# Patient Record
Sex: FEMALE | Race: WHITE
[De-identification: names, ages, dates, MRNs, and addresses within clinical notes are randomized per-mention and may not be internally consistent; named-entity substitution may affect disease eponyms.]

---

## 2022-03-03 ENCOUNTER — HOSPITAL ENCOUNTER (OUTPATIENT)
Dept: HOSPITAL 75 - CARD | Age: 62
End: 2022-03-03
Attending: INTERNAL MEDICINE
Payer: SELF-PAY

## 2022-03-03 VITALS — DIASTOLIC BLOOD PRESSURE: 75 MMHG | SYSTOLIC BLOOD PRESSURE: 146 MMHG

## 2022-03-03 DIAGNOSIS — I35.0: ICD-10-CM

## 2022-03-03 DIAGNOSIS — I51.7: Primary | ICD-10-CM

## 2022-03-03 PROCEDURE — 93017 CV STRESS TEST TRACING ONLY: CPT

## 2022-03-03 PROCEDURE — 78452 HT MUSCLE IMAGE SPECT MULT: CPT

## 2022-03-03 PROCEDURE — 93306 TTE W/DOPPLER COMPLETE: CPT

## 2022-03-03 RX ADMIN — Medication PRN ML: at 13:17

## 2022-03-03 RX ADMIN — Medication PRN ML: at 11:58

## 2022-03-04 NOTE — NUCLEAR STRESS TEST
REGADENOSON NUCLEAR STRESS


Date of procedure: 03/03/2022.


Primary care provider: None


Admitting physician: Aram Boss Jr., MD.





INDICATION: Abnormal electrocardiogram.





BASELINE ELECTROCARDIOGRAM: 


Sinus rhythm with possible old anterior myocardial infarction.





STRESS TEST PROCEDURE: The patient was administered 0.4 mg of intravenous 

Regadenoson. The resting heart rate was 85 bpm and the peak heart rate was 100 

bpm.  The resting blood pressure was 124/68 mmHg and the minimum blood pressure 

was 124/68 mmHg.  This represents a normal heart rate and a blunted blood 

pressure response to Regadenoson.  The test was stopped due to the protocol.  

There was no chest discomfort during the test.  There were no arrhythmias during

the test.  There were no significant stress induced electrocardiogram changes.





NUCLEAR PROCEDURE: The patient was administered 10.7 mCi of intravenous 

technetium 99m Tetrofosmin at rest for the rest images.  The patient was 

subsequently administered 30.9 mCi of intravenous technetium 99m Tetrofosmin at 

peak stress for the stress images.  Following an appropriate wait after each 

injection, imaging was obtained.  The images were subsequently processed and 

reformatted in the usual views.  Gated imaging was obtained.  The image quality 

was adequate with a mild degree of gastrointestinal attenuation artifact. CT 

attenuation correction was used as a adjunct to standard imaging. Both the 

corrected and uncorrected images were reviewed for interpretation. 





NUCLEAR RESULTS: There was a small, mild intensity, fixed distal anterior defect

with no evidence of inducible ischemia.  There was normal left ventricular 

chamber size with an end-diastolic volume of 35 mL and an end-systolic volume of

6 mL.  There was no evidence of transient ischemic dilatation.  The TID ratio 

was 0.95. There was normal wall motion in all segments with a calculated 

ejection fraction of 83%.





IMPRESSION:


1.  Normal heart rate and a blunted blood pressure response to regadenoson.


2.  There was no chest discomfort, arrhythmias, or electrocardiogram changes 

during the test.


3.  There was a small, mild intensity, fixed distal anterior defect with no 

evidence of inducible ischemia.


4.  There was normal wall motion in all segments with a calculated ejection frac

tion of 83%.


5.  This is an abnormal result although represents a low risk for possible 

future coronary ischemic events.





Certain portions of this document may have been dictated utilizing voice 

recognition technology.  Inherent to this technology, typographical and 

grammatical errors may exist.  As much as I am diligent to identify and correct 

these mistakes, some errors may remain in the document.











ARAM BOSS JR, MD          Mar 4, 2022 08:00

## 2023-07-20 ENCOUNTER — HOSPITAL ENCOUNTER (OUTPATIENT)
Dept: HOSPITAL 75 - ICU | Age: 63
Setting detail: OBSERVATION
LOS: 2 days | Discharge: NURSING, REHAB OR LONG-TERM CARE | End: 2023-07-22
Attending: INTERNAL MEDICINE | Admitting: INTERNAL MEDICINE
Payer: COMMERCIAL

## 2023-07-20 VITALS — DIASTOLIC BLOOD PRESSURE: 85 MMHG | SYSTOLIC BLOOD PRESSURE: 101 MMHG

## 2023-07-20 VITALS — WEIGHT: 252.21 LBS | BODY MASS INDEX: 39.58 KG/M2 | HEIGHT: 67.01 IN

## 2023-07-20 DIAGNOSIS — I11.0: ICD-10-CM

## 2023-07-20 DIAGNOSIS — I50.22: ICD-10-CM

## 2023-07-20 DIAGNOSIS — Z95.2: ICD-10-CM

## 2023-07-20 DIAGNOSIS — Z87.891: ICD-10-CM

## 2023-07-20 DIAGNOSIS — J44.9: ICD-10-CM

## 2023-07-20 DIAGNOSIS — Z79.84: ICD-10-CM

## 2023-07-20 DIAGNOSIS — I48.91: Primary | ICD-10-CM

## 2023-07-20 DIAGNOSIS — Z79.899: ICD-10-CM

## 2023-07-20 DIAGNOSIS — Z95.4: ICD-10-CM

## 2023-07-20 DIAGNOSIS — E78.5: ICD-10-CM

## 2023-07-20 DIAGNOSIS — G72.9: ICD-10-CM

## 2023-07-20 DIAGNOSIS — G47.33: ICD-10-CM

## 2023-07-20 DIAGNOSIS — E11.9: ICD-10-CM

## 2023-07-20 DIAGNOSIS — Z86.79: ICD-10-CM

## 2023-07-20 DIAGNOSIS — F32.A: ICD-10-CM

## 2023-07-20 PROCEDURE — 85610 PROTHROMBIN TIME: CPT

## 2023-07-20 PROCEDURE — 82947 ASSAY GLUCOSE BLOOD QUANT: CPT

## 2023-07-20 PROCEDURE — 36415 COLL VENOUS BLD VENIPUNCTURE: CPT

## 2023-07-20 PROCEDURE — 83735 ASSAY OF MAGNESIUM: CPT

## 2023-07-20 PROCEDURE — 80053 COMPREHEN METABOLIC PANEL: CPT

## 2023-07-20 PROCEDURE — 96365 THER/PROPH/DIAG IV INF INIT: CPT

## 2023-07-20 PROCEDURE — 96366 THER/PROPH/DIAG IV INF ADDON: CPT

## 2023-07-20 PROCEDURE — 94640 AIRWAY INHALATION TREATMENT: CPT

## 2023-07-20 PROCEDURE — 96375 TX/PRO/DX INJ NEW DRUG ADDON: CPT

## 2023-07-20 PROCEDURE — 96376 TX/PRO/DX INJ SAME DRUG ADON: CPT

## 2023-07-20 PROCEDURE — 85025 COMPLETE CBC W/AUTO DIFF WBC: CPT

## 2023-07-20 PROCEDURE — 84100 ASSAY OF PHOSPHORUS: CPT

## 2023-07-20 RX ADMIN — SENNOSIDES SCH MG: 8.6 TABLET, FILM COATED ORAL at 21:15

## 2023-07-20 RX ADMIN — INSULIN ASPART SCH UNIT: 100 INJECTION, SOLUTION INTRAVENOUS; SUBCUTANEOUS at 21:15

## 2023-07-20 RX ADMIN — DOCUSATE SODIUM SCH MG: 100 CAPSULE ORAL at 21:14

## 2023-07-20 RX ADMIN — Medication SCH MLS/HR: at 19:25

## 2023-07-20 NOTE — CONSULTATION-CARDIOLOGY
HPI-Cardiology


Cardiology Consultation:


Date of Consultation


7/20/23


Date of Admission





Attending Physician





Admitting Physician


Admitting Physician:


Lynn Villela DO 








Attending Physician:


Lynn Villela DO


Consulting Physician


POLO CAVAZOS MD





HPI:


Time Seen by a Provider:  20:59


Chief Complaint:


Tachycardia


This is a 63-year-old lady who has history of severe mitral stenosis and 

congestive heart failure and underwent mitral valve replacement a week ago at 

[facility].  She does have history of postoperative atrial fibrillation. 

She has a history of diabetes, hypertension and hyperlipidemia.





She had an episode of atrial fibrillation with RVR and currently still in RVR.  

She was started on IV Cardizem and transferred to the ICU.





Review of Systems-Cardiology


Review of Systems


Constitutional:  no symptoms reported


Eyes:  no symptoms reported


Ears/Nose/Throat:  no symptoms reported


Respiratory:  SOB with excertion


Cardiovascular:  irregular heart rate


Gastrointestinal:  no symptoms reported


Genitourinary:  no symptoms reported


Musculoskeletal:  no symptoms reported


Skin:  no symptoms reported


Psychiatric/Neurological:  no symptoms reported


Hematologic:  no symptoms reported





PMH-Social-Family Hx


Patient Social History


Smoking Status:  Former Smoker


Alcohol Use?:  No


Pt feels they are or have been:  No





Past Medical History


PMH


As described under Assessment.





Allergies and Home Medications


Allergies


Coded Allergies:  


     No Allergy Information Available (Unverified , 3/3/22)





Patient Home Medication List


Home Medication List Reviewed:  Yes


Acetaminophen (Tylenol Extra Strength) 500 Mg Tablet, 1,000 MG PO Q6H, 

(Reported)


   Entered as Reported by: PELON FABIAN on 7/19/23 0931


   Last Action: Held


Albuterol Sulfate (Ventolin Hfa) 90 Mcg Hfa.aer.ad, 1-2 PUFF INH TID PRN for 

SHORTNESS OF BREATH, (Reported)


   Entered as Reported by: PELON FABIAN on 7/19/23 0931


   Last Action: Continued


Amiodarone HCl (Amiodarone HCl) 200 Mg Tablet, 400 MG PO DAILY, (Reported)


   Entered as Reported by: PELON FABIAN on 7/19/23 0931


   Last Action: Held


Amiodarone HCl (Amiodarone HCl) 200 Mg Tablet, 200 MG PO BID, (Reported)


   Entered as Reported by: PELON FABIAN on 7/19/23 0931


   Last Action: Held


Amiodarone HCl (Amiodarone HCl) 200 Mg Tablet, 200 MG PO DAILY, (Reported)


   Entered as Reported by: PELON FABIAN on 7/19/23 0931


   Last Action: Held


Ascorbic Acid (Vitamin C) 1,000 Mg Tablet, 1,000 MG PO DAILY, (Reported)


   Entered as Reported by: PELON FABIAN on 7/19/23 0931


   Last Action: Held


Aspirin (Aspirin EC) 81 Mg Tablet.dr, 81 MG PO DAILY, (Reported)


   Entered as Reported by: PELON FABIAN on 7/19/23 0931


   Last Action: Continued


Atorvastatin Calcium (Atorvastatin Calcium) 20 Mg Tablet, 20 MG PO HS, 

(Reported)


   Entered as Reported by: PELON FABIAN on 7/19/23 0931


   Last Action: Continued


Budesonide/Formoterol Fumarate (Symbicort 80-4.5 Mcg Inhaler) 80 Mcg-4.5 

Mcg/Actuation Hfa.aer.ad, 2 PUFF IH BID, (Reported)


   Entered as Reported by: PELON FABIAN on 7/19/23 0931


   Last Action: Converted


Bumetanide (Bumetanide) 2 Mg Tablet, 2 MG PO BID, (Reported)


   Entered as Reported by: PELON FABIAN on 7/19/23 0931


   Last Action: Converted


Canagliflozin/Metformin HCl (Invokamet 50-1,000 mg Tablet) 50 Mg-1,000 Mg 

Tablet, 1 EACH PO BID, (Reported)


   Entered as Reported by: PELON FABIAN on 7/19/23 0931


   Last Action: Converted


Dulaglutide (Trulicity) 0.75 Mg/0.5 Ml Pen.injctr, 0.75 MG SQ WEEK, (Reported)


   Entered as Reported by: PELON FABIAN on 7/19/23 0931


   Last Action: Converted


Metoprolol Tartrate (Metoprolol Tartrate) 25 Mg Tablet, 12.5 MG PO BID, 

(Reported)


   Entered as Reported by: PELON FABIAN on 7/19/23 0931


   Last Action: Held


Omeprazole (Omeprazole) 20 Mg Tab.rap.dr, 20 MG PO DAILY, (Reported)


   Entered as Reported by: PELON FABIAN on 7/19/23 0931


   Last Action: Converted


Oxycodone HCl/Acetaminophen (Percocet 5-325 mg Tablet) 1 Each Tablet, 1-2 TAB PO

Q6H PRN for PAIN-MODERATE (5-7), (Reported)


   Entered as Reported by: PELON FABIAN on 7/19/23 0931


   Last Action: Continued


Potassium Chloride (K-Tab ER) 10 Meq Tablet.er, 10 MEQ PO BID, (Reported)


   Entered as Reported by: PELON FABIAN on 7/19/23 0931


   Last Action: Continued


Sennosides/Docusate Sodium (Senna-S Tablet) 8.6 Mg-50 Mg Tablet, 2 EACH PO BID, 

(Reported)


   Entered as Reported by: PELON FABIAN on 7/19/23 0931


   Last Action: Continued


Vilazodone Hydrochloride (Viibryd) 40 Mg Tablet, 40 MG PO DAILY, (Reported)


   Entered as Reported by: PELON FABIAN on 7/19/23 0931


   Last Action: Converted


Warfarin Sodium (Warfarin Sodium) 5 Mg Tablet, 5 MG PO CARDOZA,SA, (Reported)


   Entered as Reported by: PELON FABIAN on 7/19/23 0931


   Last Action: Held


Warfarin Sodium (Warfarin Sodium) 7.5 Mg Tablet, 7.5 MG PO MO,TU,WE,TH,FR, 

(Reported)


   Entered as Reported by: PELON FABIAN on 7/19/23 0931


   Last Action: Held





Exam


Vital Signs





Vital Signs








  Date Time  Temp Pulse Resp B/P (MAP) Pulse Ox O2 Delivery O2 Flow Rate FiO2


 


7/20/23 21:06  115   96   21


 


7/20/23 20:54      Room Air  


 


7/20/23 19:25    101/85    


 


7/20/23 19:00   16     








Physical Exam


Constitutional: No respiratory distress.


Chest: Clear to auscultation bilaterally.


CVS: Irregularly irregular rhythm.  No murmur.


Neuro: Alert and oriented.


Abdomen: Protuberant.


Labs





Laboratory Tests








Test


 7/20/23


21:10 Range/Units


 


 


Glucometer 144 H   MG/DL











ECG Impression


ECG


Initial ECG Impression:  Atrial Fibrillation w/RVR





A/P-Cardiology


Assessment/Admission Diagnosis


afib rvr


h/o mvr


Mitral stenosis,


History of congestive heart failure,


Diabetes,


Hypertension,


Hyperlipidemia





Plan


afib rvr - cardizem infusion, Continue warfarin.  Patient is already on 

amiodarone. Continue to follow.  If patient continues to have atrial 

fibrillation she may be a candidate for transesophageal echocardiogram assisted 

cardioversion.


history of MVR,


Chronic oral anticoagulation, continue warfarin.


Diabetes, hypertension, hyperlipidemia: Deferred to the primary team.











POLO CAVAZOS MD             Jul 20, 2023 21:00

## 2023-07-20 NOTE — TELE-ICU CONSULT
History of Present Illness


History of Present Illness


Date Seen by Provider:  Jul 20, 2023


Time Seen by Provider:  19:38


Date of Admission


7/20/23


History of Present Illness


(Tele-ICU Physician , Progress Note )


Service provided via interactive audio and video telecommunStratusLIVE  E-CARE 

system to a patient admitted to ICU bed in [facility].


Patient is seen today due to persistent need of  ICU care








Available chart/ vitals / labs / Images reviewed


Video assessment done using   teleICU camera, rest of exam as per RN





She is a 63-year-old  female with past medical history of hypertension,

type 2 diabetes mellitus who recently underwent earlier this month mitral valve 

replacement for severe mitral stenosis and congestive heart failure at [facility] and postoperatively she developed productive atrial fibrillation.  

She was started on amiodarone and warfarin.  Subsequently yesterday she is 

admitted to the inpatient rehab and via [facility].  Today she 

is transferred to ICU because of development of atrial fibrillation with rapid 

ventricular rate.  She is started on IV Cardizem drip.  Still her heart rate is 

about 120/min.  She is in no acute distress





Impression


1.  Atrial fibrillation with rapid ventricular rate


2.  S/p mitral valve replacement


3.  Type 2 diabetes mellitus


4.  Hyperlipidemia





Recommendations


1.  Continue Cardizem drip and if necessary will give IV digoxin


2.  Cardiology consultation.


3.  Diabetes management per primary care.


4.  Continue warfarin.


Coordination of care with primary care physician and bedside consultants.





I am remotely monitoring this patient from Tele icu station in [place].  I am 

unable to do the bedside exam, and history/physical and pertinent information is

taken from other notes in the computer and bedside staff.





Certain portions of this document may have been dictated utilizing voice 

recognition technology such as Dragon.  Inherent to this technology, 

typographical and grammatical errors may exist.  As much as I am diligent to 

identify and correct to these mistakes, some errors may remain in the document.





Critical care time devoted to this patient today is approximately is-30 minutes





Allergies and Home Medications


Allergies


Coded Allergies:  


     No Allergy Information Available (Unverified , 3/3/22)





Home Medications


Acetaminophen 500 Mg Tablet, 1,000 MG PO Q6H, (Reported)


Albuterol Sulfate 90 Mcg Hfa.aer.ad, 1-2 PUFF INH TID PRN for SHORTNESS OF 

BREATH, (Reported)


Amiodarone HCl 200 Mg Tablet, 400 MG PO DAILY, (Reported)


   1/3 TAKE FOR 14 DAYS- THEN DECREASE TO 200MG TWICE DAILY X 14 DAYS, THEN 

200MG DAILY X 14 DAYS TAKE WITH FOOD 


Amiodarone HCl 200 Mg Tablet, 200 MG PO BID, (Reported)


   2/3 TAKE FOR 14 DAYS THEN DECREASE TO 200MG DAILY X 14 DAYS TAKE WITH FOOD 


Amiodarone HCl 200 Mg Tablet, 200 MG PO DAILY, (Reported)


   3/3 TAKE FOR 14 DAYS TAKE WITH FOOD 


Ascorbic Acid 1,000 Mg Tablet, 1,000 MG PO DAILY, (Reported)


Aspirin 81 Mg Tablet.dr, 81 MG PO DAILY, (Reported)


Atorvastatin Calcium 20 Mg Tablet, 20 MG PO HS, (Reported)


Budesonide/Formoterol Fumarate 80 Mcg-4.5 Mcg/Actuation Hfa.aer.ad, 2 PUFF IH 

BID, (Reported)


Bumetanide 2 Mg Tablet, 2 MG PO BID, (Reported)


Canagliflozin/Metformin HCl 50 Mg-1,000 Mg Tablet, 1 EACH PO BID, (Reported)


Dulaglutide 0.75 Mg/0.5 Ml Pen.injctr, 0.75 MG SQ WEEK, (Reported)


Metoprolol Tartrate 25 Mg Tablet, 12.5 MG PO BID, (Reported)


   TAKES  OF A 25MG TAB 


Omeprazole 20 Mg Tab.rap.dr, 20 MG PO DAILY, (Reported)


   TAKE BEFORE BREAKFAST 


Oxycodone HCl/Acetaminophen 1 Each Tablet, 1-2 TAB PO Q6H PRN for PAIN-MODERATE 

(5-7), (Reported)


Potassium Chloride 10 Meq Tablet.er, 10 MEQ PO BID, (Reported)


Sennosides/Docusate Sodium 8.6 Mg-50 Mg Tablet, 2 EACH PO BID, (Reported)


Vilazodone Hydrochloride 40 Mg Tablet, 40 MG PO DAILY, (Reported)


Warfarin Sodium 5 Mg Tablet, 5 MG PO CARDOZA,SA, (Reported)


Warfarin Sodium 7.5 Mg Tablet, 7.5 MG PO MO,TU,WE,TH,FR, (Reported)





Past Medical/Social/Family Hx


Immunizations Up To Date


Tetanus Booster (TDap):  Unknown


Hepatitis A:  No


Hepatitis B:  Yes


TB Skin Test:  None





Current Status


Primary Language:  English





Review of Systems


Constitutional:  see HPI





Focused Exam


Height, Weight, BMI


Height: '"


Weight: lbs. oz. kg; 41.59 BMI


Method:





Exam


Exam


Patient acknowledged, consented, and participated in this virtual visit which 

was conducted using real time audio/video


Vital Signs








  Date Time  Temp Pulse Resp B/P (MAP) Pulse Ox O2 Delivery O2 Flow Rate FiO2


 


7/20/23 19:25  130  101/85    


 


7/20/23 19:00  130 16 101/85 (90) 96 Room Air  


 


7/20/23 18:45  130 16 72/53 (59) 96 Room Air  


 


7/20/23 18:30  123 14 88/68 (75) 98 Room Air  


 


7/20/23 18:20  132      


 


7/20/23 18:20  132      


 


7/20/23 18:15  115 14 124/110 (115) 98 Room Air  








Height & Weight


Height: '"


Weight: lbs. oz. kg; 41.59 BMI


Method:


General Appearance:  Anxious, Obese





Assessment/Plan


Assessment/Plan


as above


Critical Care:  Critically Ill Patient


Time spent with patient (mins):  30











HIRO HSIEH MD                Jul 20, 2023 20:07

## 2023-07-21 LAB
ALBUMIN SERPL-MCNC: 3.3 GM/DL (ref 3.2–4.5)
ALP SERPL-CCNC: 60 U/L (ref 40–136)
ALT SERPL-CCNC: 16 U/L (ref 0–55)
BASOPHILS # BLD AUTO: 0.1 10^3/UL (ref 0–0.1)
BASOPHILS NFR BLD AUTO: 1 % (ref 0–10)
BILIRUB SERPL-MCNC: 0.7 MG/DL (ref 0.1–1)
BUN/CREAT SERPL: 36
CALCIUM SERPL-MCNC: 9.2 MG/DL (ref 8.5–10.1)
CHLORIDE SERPL-SCNC: 100 MMOL/L (ref 98–107)
CO2 SERPL-SCNC: 25 MMOL/L (ref 21–32)
CREAT SERPL-MCNC: 1.12 MG/DL (ref 0.6–1.3)
EOSINOPHIL # BLD AUTO: 0.6 10^3/UL (ref 0–0.3)
EOSINOPHIL NFR BLD AUTO: 6 % (ref 0–10)
GFR SERPLBLD BASED ON 1.73 SQ M-ARVRAT: 56 ML/MIN
GLUCOSE SERPL-MCNC: 118 MG/DL (ref 70–105)
HCT VFR BLD CALC: 30 % (ref 35–52)
HGB BLD-MCNC: 8.6 G/DL (ref 11.5–16)
INR PPP: 2.2 (ref 0.8–1.4)
LYMPHOCYTES # BLD AUTO: 2.3 10^3/UL (ref 1–4)
LYMPHOCYTES NFR BLD AUTO: 20 % (ref 12–44)
MAGNESIUM SERPL-MCNC: 1.5 MG/DL (ref 1.6–2.4)
MANUAL DIFFERENTIAL PERFORMED BLD QL: NO
MCH RBC QN AUTO: 26 PG (ref 25–34)
MCHC RBC AUTO-ENTMCNC: 29 G/DL (ref 32–36)
MCV RBC AUTO: 89 FL (ref 80–99)
MONOCYTES # BLD AUTO: 1.1 10^3/UL (ref 0–1)
MONOCYTES NFR BLD AUTO: 10 % (ref 0–12)
NEUTROPHILS # BLD AUTO: 7.1 10^3/UL (ref 1.8–7.8)
NEUTROPHILS NFR BLD AUTO: 63 % (ref 42–75)
PHOSPHATE SERPL-MCNC: 4 MG/DL (ref 2.3–4.7)
PLATELET # BLD: 308 10^3/UL (ref 130–400)
PMV BLD AUTO: 9.3 FL (ref 9–12.2)
POTASSIUM SERPL-SCNC: 4.4 MMOL/L (ref 3.6–5)
PROT SERPL-MCNC: 6.3 GM/DL (ref 6.4–8.2)
PROTHROMBIN TIME: 24.6 SEC (ref 12.2–14.7)
SODIUM SERPL-SCNC: 137 MMOL/L (ref 135–145)
WBC # BLD AUTO: 11.3 10^3/UL (ref 4.3–11)

## 2023-07-21 RX ADMIN — MAGNESIUM SULFATE IN DEXTROSE SCH MLS/HR: 10 INJECTION, SOLUTION INTRAVENOUS at 08:08

## 2023-07-21 RX ADMIN — DILTIAZEM HYDROCHLORIDE SCH MG: 120 CAPSULE, COATED, EXTENDED RELEASE ORAL at 08:09

## 2023-07-21 RX ADMIN — MAGNESIUM SULFATE IN DEXTROSE SCH MLS/HR: 10 INJECTION, SOLUTION INTRAVENOUS at 06:15

## 2023-07-21 RX ADMIN — ASPIRIN SCH MG: 81 TABLET ORAL at 08:05

## 2023-07-21 RX ADMIN — METFORMIN HYDROCHLORIDE SCH MG: 500 TABLET, FILM COATED ORAL at 18:11

## 2023-07-21 RX ADMIN — INSULIN ASPART SCH UNIT: 100 INJECTION, SOLUTION INTRAVENOUS; SUBCUTANEOUS at 06:15

## 2023-07-21 RX ADMIN — DOCUSATE SODIUM AND SENNOSIDES SCH EA: 8.6; 5 TABLET, FILM COATED ORAL at 08:06

## 2023-07-21 RX ADMIN — POTASSIUM CHLORIDE SCH MEQ: 750 TABLET, FILM COATED, EXTENDED RELEASE ORAL at 08:07

## 2023-07-21 RX ADMIN — PANTOPRAZOLE SCH MG: 20 TABLET, DELAYED RELEASE ORAL at 06:30

## 2023-07-21 RX ADMIN — POTASSIUM CHLORIDE SCH MLS/HR: 200 INJECTION, SOLUTION INTRAVENOUS at 06:15

## 2023-07-21 RX ADMIN — BUMETANIDE SCH MG: 1 TABLET ORAL at 20:39

## 2023-07-21 RX ADMIN — INSULIN ASPART SCH UNIT: 100 INJECTION, SOLUTION INTRAVENOUS; SUBCUTANEOUS at 11:00

## 2023-07-21 RX ADMIN — DOCUSATE SODIUM AND SENNOSIDES SCH EA: 8.6; 5 TABLET, FILM COATED ORAL at 20:53

## 2023-07-21 RX ADMIN — POTASSIUM CHLORIDE SCH MEQ: 1500 TABLET, EXTENDED RELEASE ORAL at 06:15

## 2023-07-21 RX ADMIN — SENNOSIDES SCH MG: 8.6 TABLET, FILM COATED ORAL at 20:53

## 2023-07-21 RX ADMIN — BUMETANIDE SCH MG: 1 TABLET ORAL at 08:06

## 2023-07-21 RX ADMIN — EMPAGLIFLOZIN SCH MG: 10 TABLET, FILM COATED ORAL at 08:07

## 2023-07-21 RX ADMIN — INSULIN ASPART SCH UNIT: 100 INJECTION, SOLUTION INTRAVENOUS; SUBCUTANEOUS at 16:51

## 2023-07-21 RX ADMIN — DOCUSATE SODIUM SCH MG: 100 CAPSULE ORAL at 08:05

## 2023-07-21 RX ADMIN — Medication SCH MLS/HR: at 16:52

## 2023-07-21 RX ADMIN — METFORMIN HYDROCHLORIDE SCH MG: 500 TABLET, FILM COATED ORAL at 08:07

## 2023-07-21 RX ADMIN — DOCUSATE SODIUM SCH MG: 100 CAPSULE ORAL at 20:53

## 2023-07-21 RX ADMIN — MAGNESIUM SULFATE IN DEXTROSE SCH MLS/HR: 10 INJECTION, SOLUTION INTRAVENOUS at 06:30

## 2023-07-21 RX ADMIN — INSULIN ASPART SCH UNIT: 100 INJECTION, SOLUTION INTRAVENOUS; SUBCUTANEOUS at 20:53

## 2023-07-21 RX ADMIN — MAGNESIUM SULFATE IN DEXTROSE SCH MLS/HR: 10 INJECTION, SOLUTION INTRAVENOUS at 09:36

## 2023-07-21 RX ADMIN — POTASSIUM CHLORIDE SCH MEQ: 750 TABLET, FILM COATED, EXTENDED RELEASE ORAL at 20:38

## 2023-07-21 RX ADMIN — SENNOSIDES SCH MG: 8.6 TABLET, FILM COATED ORAL at 08:07

## 2023-07-21 NOTE — CARDIOLOGY PROGRESS NOTE
Cardiology SOAP Progress Note


Subjective:


No further palpitations.





Objective:


I&O/Vital Signs











 7/21/23 7/21/23 7/21/23 7/21/23





 04:00 04:00 04:00 05:00


 


Temp  36.4  


 


Pulse   81 80


 


B/P (MAP)   118/71 (87) 124/70 (88)


 


Pulse Ox 100  100 100


 


O2 Delivery Room Air  Room Air Room Air


 


    





 7/21/23 7/21/23 7/21/23 7/21/23





 06:00 07:00 07:00 08:00


 


Pulse 79 82 85 


 


B/P (MAP)   111/75 (87) 


 


Pulse Ox 100  95 98


 


O2 Delivery Room Air  Room Air Room Air





 7/21/23 7/21/23 7/21/23 7/21/23





 08:00 08:16 09:00 09:18


 


Temp  36.3  


 


Pulse 85  81 


 


Pulse Ox 92  94 97


 


O2 Delivery Room Air  Room Air Room Air


 


    





 7/21/23 7/21/23 7/21/23 7/21/23





 10:00 11:00 12:00 12:00


 


Pulse 85 85  90


 


Pulse Ox 97 97 98 97


 


O2 Delivery Room Air Room Air Room Air Room Air





 7/21/23 7/21/23 7/21/23 7/21/23





 12:13 12:19 13:00 14:00


 


Temp 36.2   


 


Pulse  94 87 89


 


Pulse Ox   99 100


 


O2 Delivery   Room Air Room Air


 


    





 7/21/23   





 15:00   


 


Pulse 85   


 


Pulse Ox 97   


 


O2 Delivery Room Air   














 7/21/23





 00:00


 


Intake Total 240 ml


 


Balance 240 ml








Constitutional:  AAO x 3


Respiratory:  lungs clear to auscultation


Cardiovascular:  regular rate-rhythm


Gastrointestional:  soft


Neurologic/Psychiatric:  no motor/sensory deficits, alert, normal mood/affect, 

oriented x 3


Skin:  normal color





Results/Procedures:


Labs


Laboratory Tests


7/20/23 21:10: Glucometer 144H


7/21/23 05:27: 


White Blood Count 11.3H, Red Blood Count 3.31L, Hemoglobin 8.6L, Hematocrit 30L,

Mean Corpuscular Volume 89, Mean Corpuscular Hemoglobin 26, Mean Corpuscular 

Hemoglobin Concent 29L, Red Cell Distribution Width 27.1H, Platelet Count 308, 

Mean Platelet Volume 9.3, Immature Granulocyte % (Auto) 1, Neutrophils (%) 

(Auto) 63, Lymphocytes (%) (Auto) 20, Monocytes (%) (Auto) 10, Eosinophils (%) 

(Auto) 6, Basophils (%) (Auto) 1, Neutrophils # (Auto) 7.1, Lymphocytes # (Auto)

2.3, Monocytes # (Auto) 1.1H, Eosinophils # (Auto) 0.6H, Basophils # (Auto) 0.1,

Immature Granulocyte # (Auto) 0.1, Prothrombin Time 24.6H, INR Comment 2.2H, 

Sodium Level 137, Potassium Level 4.4, Chloride Level 100, Carbon Dioxide Level 

25, Anion Gap 12, Blood Urea Nitrogen 40H, Creatinine 1.12, Estimat Glomerular 

Filtration Rate 56, BUN/Creatinine Ratio 36, Glucose Level 118H, Calcium Level 

9.2, Corrected Calcium 9.8, Phosphorus Level 4.0, Magnesium Level 1.5L, Total 

Bilirubin 0.7, Aspartate Amino Transf (AST/SGOT) 28, Alanine Aminotransferase 

(ALT/SGPT) 16, Alkaline Phosphatase 60, Total Protein 6.3L, Albumin 3.3


7/21/23 10:43: Glucometer 104








A/P:


Assessment/Dx:


afib rvr


h/o mvr


Mitral stenosis,


History of congestive heart failure,


Diabetes,


Hypertension,


Hyperlipidemia


Plan:


Paroxysmal atrial fibrillation: Converted to sinus rhythm.  Change Cardizem 

infusion to Cardizem CD1 20 mg daily.  Start amiodarone 200 mg daily.  Continue 

warfarin.  Follow-up as an outpatient with cardiology.


history of MVR,


Chronic oral anticoagulation, continue warfarin.


Diabetes, hypertension, hyperlipidemia: Deferred to the primary team.








Thank you for your consultation. Please call me if you have any questions.








MONI Nolasco MD, FACP, FACC, FSCAI, FHRS, CCDS


Interventional Cardiology


Cardiac Electrophysiology


Vascular Medicine and Endovascular Interventions











POLO NOLASCO MD             Jul 21, 2023 15:09

## 2023-07-21 NOTE — TELE-ICU PROGRESS NOTE
Subjective


Date Seen by a Provider:  Jul 21, 2023


Time Seen by a Provider:  11:26


Subjective/Events-last exam











(Tele-ICU Physician , Progress Note )


Service provided via interactive audio and video telecommunications  E-CARE 

system to a patient admitted to ICU bed in [facility].


Patient is seen today due to persistent need of  ICU care





Available chart/ vitals / labs / Images reviewed


Video assessment done using   teleICU camera, rest of exam as per RN


Discussed with RN


Events overnight : 





Afebrile


hemodynamically stable


Respiratory - ra


I/O = +





Drips: 





Pressors- no








Hospital course:





This is a 63-year-old lady who has history of severe mitral stenosis and 

congestive heart failure and underwent mitral valve replacement a week ago at 

[facility].  She does have history of postoperative atrial fibrillation. 

She has a history of diabetes, hypertension and hyperlipidemia.





She had an episode of atrial fibrillation with RVR


- currently off  IV Cardizem, on PO meds 





h/o MVR 


History of congestive heart failure,


DM II = controlled 





wound service for sternal wound care 





VTE Prophylaxis: coumadin


Stress Ulcer Prophylaxis: na








Plans in collaboration with   bedside consultants and IM MDs.


Discussed with RN to reach out if any questions or concerns


Case and care  daily discussed on multidisciplinary  rounds ( RN, PharmD, 

Nutritionist , Respiratory Therapy,  ) 





A total of _10minutes of critical care time was devoted to this patient today, 

required to treat and/or prevent further deterioration of critical care 

condition ( as above ) .











I am remotely monitoring this patient from [place] state.  I am unable to do the

bedside exam, and history/physical and pertinent information is taken from other

notes in the computer and bedside staff.





Sepsis Event


Evaluation


Height, Weight, BMI


Height: '"


Weight: lbs. oz. kg; 41.59 BMI


Method:





Exam


Exam


Patient acknowledged, consented, and participated in this virtual visit which 

was conducted using real time audio/video


Vital Signs








  Date Time  Temp Pulse Resp B/P (MAP) Pulse Ox O2 Delivery O2 Flow Rate FiO2


 


7/21/23 11:00  85   97 Room Air  


 


7/21/23 10:00  85   97 Room Air  


 


7/21/23 09:18     97 Room Air  


 


7/21/23 09:00  81   94 Room Air  


 


7/21/23 08:16 36.3       


 


7/21/23 08:00  85   92 Room Air  


 


7/21/23 08:00     98 Room Air  


 


7/21/23 07:00  85  111/75 (87) 95 Room Air  


 


7/21/23 07:00  82      


 


7/21/23 06:00  79   100 Room Air  


 


7/21/23 05:00  80  124/70 (88) 100 Room Air  


 


7/21/23 04:00  81  118/71 (87) 100 Room Air  


 


7/21/23 04:00 36.4       


 


7/21/23 04:00     100 Room Air  


 


7/21/23 03:00  80  124/75 (91) 100 Room Air  


 


7/21/23 02:39  72      


 


7/21/23 02:00  125  112/76 (88) 100 Room Air  


 


7/21/23 01:00  124      


 


7/21/23 01:00  118  116/81 (93) 100 Room Air  


 


7/21/23 00:00 36.3       


 


7/21/23 00:00  116  114/77 (89) 100 Room Air  


 


7/20/23 23:00  118  100/69 (79) 100 Room Air  


 


7/20/23 22:00  124 14 100/63 (75) 99 Room Air  


 


7/20/23 21:06  115   96   21


 


7/20/23 21:00  131 19 100/70 (80) 99 Room Air  


 


7/20/23 20:54      Room Air  


 


7/20/23 20:00  130 16 103/78 (86) 99 Room Air  


 


7/20/23 19:25  130  101/85    


 


7/20/23 19:00  130 16 101/85 (90) 96 Room Air  


 


7/20/23 19:00  135      


 


7/20/23 18:45  130 16 72/53 (59) 96 Room Air  


 


7/20/23 18:30  123 14 88/68 (75) 98 Room Air  


 


7/20/23 18:20  132      


 


7/20/23 18:20  132      


 


7/20/23 18:15  115 14 124/110 (115) 98 Room Air  


 


7/20/23 18:11     96 Room Air  














I & O 


 


 7/21/23





 07:00


 


Intake Total 485 ml


 


Balance 485 ml








Height & Weight


Height: '"


Weight: lbs. oz. kg; 41.59 BMI


Method:


General Appearance:  Anxious, Obese





Results


Lab


Laboratory Tests


7/21/23 05:27











Assessment/Plan


Assessment/Plan


1











DESTINEY MORIN MD         Jul 21, 2023 11:26

## 2023-07-21 NOTE — SHORT STAY SUMMARY
RYLEE CROCKETT 7/21/23 1235:


History of Present Illness


History of Present Illness


Reason for visit/HPI


Jessica Gomez is a 62yo F who was a patient on the inpatient rehab unit 

before developing palpitations and tachycardia on the evening of 7/20. She was 

found to be in afib with RVR and transferred to the ICU. 


HPI from admission to inpatient rehab on 7/19





"Jessica Gomez is a 63yoF who presents to acute rehab unit on 7/19 for 

intensive rehabilitation related to her myopathy after undergoing MVR 

replacement at  for decompensated heart failure. She was hospitalized at  in

late June after experiencing progressively worsening shortness of breath, 

swelling, and weight gain. She was extensively volume overloaded and required 

dialysis as an inpatient. She was initially diagnosed with mitral valve stenosis

in march of 2022 and was started on warfarin due to level of stenosis but was 

lost to follow up. Her symptoms began worsening in early June 2023 which 

ultimately lead to her admission to . She required hemodynamic support while 

hospitalized and undergoing dialysis as well with milrinone and vasopressin. 

After successful volume normalization and achieving hemodynamic stability she 

underwent tissue Mitral valve replacement on July 11. She developed atrial 

fibrillation after her procedure and was started on amiodarone with conversion 

to sinus rhythm. Today she reports much improvement of her shortness of breath 

and peripheral edema since her hospitalization. She denies chest pain aside from

tenderness at her surgical site. She denies palpitations. Reports her lower 

extremity edema is back to prehospital baseline, trace edema present 

bilaterally. She is tolerating diet without N/V/D. Denies fevers and chills."





She was doing well on the inpatient rehab unit and progressing with therapy 

until the evening of 7/20 when she began to feel palpitations and tachycardia. 

She called the nurse who confirmed a fast heart rate. EKG shortly after revealed

her to be in atrial fibrillation with rapid ventricular response, rate in the 

130s. Arrangements were made for her to be transferred to the ICU. She was 

started on a cardizem drip and converted to sinus rhythm with rate in the 80s 

overnight. On my exam she remained in sinus rhythm with rate anywhere from 75-

85. The drip was stopped and she was given her first dose of oral cardizem this 

morning. She denies any more palpitations and does not have chest pain, 

shortness of breath, lightheadedness, syncope, or fever/chills. She has a 

history of afib with rvr around her mitral valve replacement surgery. She does 

not believe she was cardioverted at that time. She has been on an amiodarone 

taper since with no other episodes of palpitations. She is in good spirits this 

morning but is somewhat dejected because she thought she was improving in rehab 

and does not want any more setbacks. She is tolerating her diet without N/V/D. 

Denies any abdominal pain. She reports willingness to return to inpatient rehab 

soon pending insurance approval.


Date of Admission


Jul 20, 2023 at 18:09


Date of Discharge


Jul 21, 2023


Time Seen by Provider:  11:05


Attending Physician





Admitting Physician


Admitting Physician:


Lynn Louis DO 








Attending Physician:


Lynn Louis DO


Consult








Allergies and Home Medications


Allergies


Coded Allergies:  


     No Allergy Information Available (Unverified , 3/3/22)





Patient Home Medication List


Home Medication List Reviewed:  Yes


Acetaminophen (Tylenol Extra Strength) 500 Mg Tablet, 1,000 MG PO Q6H, 

(Reported)


   Entered as Reported by: PELON FABIAN on 7/19/23 0931


   Last Action: Held


Albuterol Sulfate (Ventolin Hfa) 90 Mcg Hfa.aer.ad, 1-2 PUFF INH TID PRN for 

SHORTNESS OF BREATH, (Reported)


   Entered as Reported by: PELON FABIAN on 7/19/23 0931


   Last Action: Continued


Amiodarone HCl (Amiodarone HCl) 200 Mg Tablet, 400 MG PO DAILY, (Reported)


   Entered as Reported by: PELON FABIAN on 7/19/23 0931


   Last Action: Held


Amiodarone HCl (Amiodarone HCl) 200 Mg Tablet, 200 MG PO BID, (Reported)


   Entered as Reported by: PELON FABIAN on 7/19/23 0931


   Last Action: Held


Amiodarone HCl (Amiodarone HCl) 200 Mg Tablet, 200 MG PO DAILY, (Reported)


   Entered as Reported by: PELON FABIAN on 7/19/23 0931


   Last Action: Held


Ascorbic Acid (Vitamin C) 1,000 Mg Tablet, 1,000 MG PO DAILY, (Reported)


   Entered as Reported by: PELON FABIAN on 7/19/23 0931


   Last Action: Held


Aspirin (Aspirin EC) 81 Mg Tablet.dr, 81 MG PO DAILY, (Reported)


   Entered as Reported by: PELON FABIAN on 7/19/23 0931


   Last Action: Continued


Atorvastatin Calcium (Atorvastatin Calcium) 20 Mg Tablet, 20 MG PO HS, 

(Reported)


   Entered as Reported by: PELON FABIAN on 7/19/23 0931


   Last Action: Continued


Budesonide/Formoterol Fumarate (Symbicort 80-4.5 Mcg Inhaler) 80 Mcg-4.5 

Mcg/Actuation Hfa.aer.ad, 2 PUFF IH BID, (Reported)


   Entered as Reported by: PELON FABIAN on 7/19/23 0931


   Last Action: Converted


Bumetanide (Bumetanide) 2 Mg Tablet, 2 MG PO BID, (Reported)


   Entered as Reported by: PELON FABIAN on 7/19/23 0931


   Last Action: Converted


Canagliflozin/Metformin HCl (Invokamet Xr 150-1,000 mg Tab) 150 Mg-1,000 Mg 

Tab.bp.24h, 1 EA PO DAILY, (Reported)


   Entered as Reported by: PELON FABIAN on 7/21/23 1057


   Last Action: Reviewed


Dulaglutide (Trulicity) 0.75 Mg/0.5 Ml Pen.injctr, 0.75 MG SQ WEEK, (Reported)


   Entered as Reported by: PELON FABIAN on 7/19/23 0931


   Last Action: Converted


Metoprolol Tartrate (Metoprolol Tartrate) 25 Mg Tablet, 12.5 MG PO BID, 

(Reported)


   Entered as Reported by: PELON FABIAN on 7/19/23 0931


   Last Action: Held


Omeprazole (Omeprazole) 20 Mg Tab.rap., 20 MG PO DAILY, (Reported)


   Entered as Reported by: PELON FABIAN on 7/19/23 0931


   Last Action: Converted


Oxycodone HCl/Acetaminophen (Percocet 5-325 mg Tablet) 1 Each Tablet, 1-2 TAB PO

Q6H PRN for PAIN-MODERATE (5-7), (Reported)


   Entered as Reported by: PELON FABIAN on 7/19/23 0931


   Last Action: Continued


Potassium Chloride (K-Tab ER) 10 Meq Tablet.er, 10 MEQ PO BID, (Reported)


   Entered as Reported by: PELON FABIAN on 7/19/23 0931


   Last Action: Continued


Sennosides/Docusate Sodium (Senna-S Tablet) 8.6 Mg-50 Mg Tablet, 2 EACH PO BID, 

(Reported)


   Entered as Reported by: PELON FABIAN on 7/19/23 0931


   Last Action: Continued


Vilazodone Hydrochloride (Viibryd) 40 Mg Tablet, 40 MG PO DAILY, (Reported)


   Entered as Reported by: PELON FABIAN on 7/19/23 0931


   Last Action: Converted


Warfarin Sodium (Warfarin Sodium) 5 Mg Tablet, 5 MG PO CARDOZA,SA, (Reported)


   Entered as Reported by: PELON FABIAN on 7/19/23 0931


   Last Action: Held


Warfarin Sodium (Warfarin Sodium) 7.5 Mg Tablet, 7.5 MG PO MO,TU,WE,TH,FR, 

(Reported)


   Entered as Reported by: PELON FABIAN on 7/19/23 0931


   Last Action: Held


Discontinued Medications


Canagliflozin/Metformin HCl (Invokamet 50-1,000 mg Tablet) 50 Mg-1,000 Mg 

Tablet, 1 EACH PO BID, (Reported)


   Discontinued Reason: No Longer Taking


   Entered as Reported by: PELON FABIAN on 7/19/23 0931


   Last Action: Discontinued





Past Medical-Social-Family Hx


Patient Social History


Smoking Status:  Former Smoker


Alcohol Use?:  No


Pt feels they are or have been:  No





Surgeries


Open Heart Surgery





Respiratory


COPD, Sleep Apnea


Currently Using CPAP:  No


Currently Using BIPAP:  No





Cardiovascular


Atrial Fibrillation, Coronary Artery Disease, High Cholesterol, Hypertension, 

Valvular Heart Disease





Neurological


Neuropathy





Genitourinary


Bladder Infection





Gastrointestinal


Gastroesophageal Reflux





Musculoskeletal


Arthritis, Chronic Back Pain





Endocrine


Endocrine Disorders:  Diabetes, Non-Insulin dep





Psychosocial


Behavioral Health Disorders:  Anxiety, Depression





Family Medical History


Significant Family History:  Heart Disease





Review of Systems


Constitutional:  No chills, No diaphoresis, No fever


EENTM:  No hearing loss, No vision loss, No hoarseness


Respiratory:  No cough, No short of breath, No wheezing


Cardiovascular:  No chest pain; Hx of Intervention; No palpitations, No syncope


Gastrointestinal:  No abdominal pain; loss of appetite; No nausea, No vomiting


Genitourinary:  No dysuria, No hematuria


Skin:  No change in color, No change in hair/nails


Psychiatric/Neurological:  Denies Headache, Denies Pre-Existing Deficit, Denies 

Weakness





Physical Exam


Vital Signs





Vital Signs - First Documented








 7/20/23 7/20/23 7/20/23 7/21/23





 18:11 18:15 21:06 00:00


 


Temp    36.3


 


Pulse  115  


 


Resp  14  


 


B/P (MAP)  124/110 (115)  


 


Pulse Ox 96   


 


O2 Delivery Room Air   


 


FiO2   21 





Capillary Refill :


Height, Weight, BMI


Height: '"


Weight: lbs. oz. kg; 41.59 BMI


Method:


General Appearance:  No Apparent Distress, WD/WN, Anxious, Obese


HEENT:  PERRL/EOMI, Moist Mucous Membranes


Neck:  Non Tender, Supple


Respiratory:  Lungs Clear, Normal Breath Sounds, No Accessory Muscle Use, No 

Respiratory Distress


Cardiovascular:  Regular Rate, Rhythm, Normal Peripheral Pulses


Gastrointestinal:  Non Tender, Soft; No Distended


Rectal:  Deferred


Extremity:  Pedal Edema (2+ bl lower extremity)


Neurologic/Psychiatric:  Alert, Oriented x3, Normal Mood/Affect


Skin:  Normal Color, Warm/Dry





Short Stay Diagnosis


Discharge Diagnosis-Short Stay


Admission Diagnosis:  


Atrial Fibrillation with Rapid Ventricular Response


CHF myopathy


Recent Mitral Valve replacement


Final Discharge Diagnosis:  


Afib with RVR


CHF myopathy


Recent mitral valve replacement





Conclusion


Labs


Laboratory Tests


7/20/23 21:10: Glucometer 144H


7/21/23 05:27: 


White Blood Count 11.3H, Red Blood Count 3.31L, Hemoglobin 8.6L, Hematocrit 30L,

Mean Corpuscular Volume 89, Mean Corpuscular Hemoglobin 26, Mean Corpuscular 

Hemoglobin Concent 29L, Red Cell Distribution Width 27.1H, Platelet Count 308, 

Mean Platelet Volume 9.3, Immature Granulocyte % (Auto) 1, Neutrophils (%) 

(Auto) 63, Lymphocytes (%) (Auto) 20, Monocytes (%) (Auto) 10, Eosinophils (%) 

(Auto) 6, Basophils (%) (Auto) 1, Neutrophils # (Auto) 7.1, Lymphocytes # (Auto)

2.3, Monocytes # (Auto) 1.1H, Eosinophils # (Auto) 0.6H, Basophils # (Auto) 0.1,

Immature Granulocyte # (Auto) 0.1, Prothrombin Time 24.6H, INR Comment 2.2H, 

Sodium Level 137, Potassium Level 4.4, Chloride Level 100, Carbon Dioxide Level 

25, Anion Gap 12, Blood Urea Nitrogen 40H, Creatinine 1.12, Estimat Glomerular 

Filtration Rate 56, BUN/Creatinine Ratio 36, Glucose Level 118H, Calcium Level 

9.2, Corrected Calcium 9.8, Phosphorus Level 4.0, Magnesium Level 1.5L, Total 

Bilirubin 0.7, Aspartate Amino Transf (AST/SGOT) 28, Alanine Aminotransferase 

(ALT/SGPT) 16, Alkaline Phosphatase 60, Total Protein 6.3L, Albumin 3.3


7/21/23 10:43: Glucometer 104


Conclusion/Plan


Atrial fibrillation with RVR


   Cardiology consulted appreciate recs


   Rate in the 130s when admitted to ICU


   Started on cardizem drip with conversion to normal sinus rhythm


   Drip stopped, started on Cardizem 120mg PO daily


   remains sinus


   Anticoagulated with warfarin, recently increased to 6.5 daily due to 

subtherapuetic INR


   monitor INR, goal of 2.5-3.5 due to mitral valve replacement


   Continue tele


   Was in the middle of an amiodarone taper after her first bout of afib with 

RVR at  earlier this month





CHF myopathy


S/p MVR


Chronic HFpEF


   Intense PT/OT


   Social work following for discharge planning


   Currently hemodynamically stable, monitor vitals


   Continue Bumex 2mg BID, metoprolol 12.5mg BID,  aspirin 81 mg daily, 

atorvastatin 20mg daily


   Monitor CBC, CMP, PT, INR


   Encourage LE elevation and LE compression


   Sodium restricted diet 2gm per day


   Sternal wound precautions


   


   


HTN


HLD


   home meds as above





T2DM


   Continue canagliflozin/metfomin, and dulaglutide


   accuchecks


   SSI





COPD


OMEGA


   Satting well on room air


   Does not tolerate CPAP machine use


   Daily Breo Ellipta


   Albuterol prn





Depression


   continue viibryd 40mg daily





Diet- carb consistent with sodium restriction


DVT ppx- anticoagulated with warfarin, continue to monitor INR


Code- full code


Dispo- attempting to readmit to inpatient rehab. Waiting on transfer vs having 

to complete insurance acceptance process over again





LYNN LOUIS DO 7/21/23 2051:


History of Present Illness


History of Present Illness


Reason for visit/HPI


CC: AF RVR


Date of Admission


7/21/23


Date of Discharge


na


Time Seen by Provider:  11:00





Allergies and Home Medications


Allergies


Coded Allergies:  


     No Allergy Information Available (Unverified , 3/3/22)





Patient Home Medication List


Home Medication List Reviewed:  Yes


Acetaminophen (Tylenol Extra Strength) 500 Mg Tablet, 1,000 MG PO Q6H, 

(Reported)


   Entered as Reported by: PELON FABIAN on 7/19/23 0931


   Last Action: Held


Albuterol Sulfate (Ventolin Hfa) 90 Mcg Hfa.aer.ad, 1-2 PUFF INH TID PRN for 

SHORTNESS OF BREATH, (Reported)


   Entered as Reported by: PELON FABIAN on 7/19/23 0931


   Last Action: Continued


Amiodarone HCl (Amiodarone HCl) 200 Mg Tablet, 400 MG PO DAILY, (Reported)


   Entered as Reported by: PELON FABIAN on 7/19/23 0931


   Last Action: Held


Amiodarone HCl (Amiodarone HCl) 200 Mg Tablet, 200 MG PO BID, (Reported)


   Entered as Reported by: PELON FABIAN on 7/19/23 0931


   Last Action: Held


Amiodarone HCl (Amiodarone HCl) 200 Mg Tablet, 200 MG PO DAILY, (Reported)


   Entered as Reported by: PELON FABIAN on 7/19/23 0931


   Last Action: Held


Ascorbic Acid (Vitamin C) 1,000 Mg Tablet, 1,000 MG PO DAILY, (Reported)


   Entered as Reported by: PELON FABIAN on 7/19/23 0931


   Last Action: Held


Aspirin (Aspirin EC) 81 Mg Tablet.dr, 81 MG PO DAILY, (Reported)


   Entered as Reported by: EPLON FABIAN on 7/19/23 0931


   Last Action: Continued


Atorvastatin Calcium (Atorvastatin Calcium) 20 Mg Tablet, 20 MG PO HS, 

(Reported)


   Entered as Reported by: PELON FABIAN on 7/19/23 0931


   Last Action: Continued


Budesonide/Formoterol Fumarate (Symbicort 80-4.5 Mcg Inhaler) 80 Mcg-4.5 

Mcg/Actuation Hfa.aer.ad, 2 PUFF IH BID, (Reported)


   Entered as Reported by: PELON FABIAN on 7/19/23 0931


   Last Action: Converted


Bumetanide (Bumetanide) 2 Mg Tablet, 2 MG PO BID, (Reported)


   Entered as Reported by: PELON FABIAN on 7/19/23 0931


   Last Action: Converted


Canagliflozin/Metformin HCl (Invokamet Xr 150-1,000 mg Tab) 150 Mg-1,000 Mg 

Tab.bp.24h, 1 EA PO DAILY, (Reported)


   Entered as Reported by: PELON FABIAN on 7/21/23 1057


   Last Action: Reviewed


Dulaglutide (Trulicity) 0.75 Mg/0.5 Ml Pen.injctr, 0.75 MG SQ WEEK, (Reported)


   Entered as Reported by: PELON FABIAN on 7/19/23 0931


   Last Action: Converted


Metoprolol Tartrate (Metoprolol Tartrate) 25 Mg Tablet, 12.5 MG PO BID, 

(Reported)


   Entered as Reported by: PELON FABIAN on 7/19/23 0931


   Last Action: Held


Omeprazole (Omeprazole) 20 Mg Tab.rap.dr, 20 MG PO DAILY, (Reported)


   Entered as Reported by: PELON FABIAN on 7/19/23 0931


   Last Action: Converted


Oxycodone HCl/Acetaminophen (Percocet 5-325 mg Tablet) 1 Each Tablet, 1-2 TAB PO

Q6H PRN for PAIN-MODERATE (5-7), (Reported)


   Entered as Reported by: PELON FABIAN on 7/19/23 0931


   Last Action: Continued


Potassium Chloride (K-Tab ER) 10 Meq Tablet.er, 10 MEQ PO BID, (Reported)


   Entered as Reported by: PELON FABIAN on 7/19/23 0931


   Last Action: Continued


Sennosides/Docusate Sodium (Senna-S Tablet) 8.6 Mg-50 Mg Tablet, 2 EACH PO BID, 

(Reported)


   Entered as Reported by: PELON FABIAN on 7/19/23 0931


   Last Action: Continued


Vilazodone Hydrochloride (Viibryd) 40 Mg Tablet, 40 MG PO DAILY, (Reported)


   Entered as Reported by: PELON FABIAN on 7/19/23 0931


   Last Action: Converted


Warfarin Sodium (Warfarin Sodium) 5 Mg Tablet, 5 MG PO CARDOZA,SA, (Reported)


   Entered as Reported by: PELON FABIAN on 7/19/23 0931


   Last Action: Held


Warfarin Sodium (Warfarin Sodium) 7.5 Mg Tablet, 7.5 MG PO MO,TU,WE,TH,FR, 

(Reported)


   Entered as Reported by: PELON FABIAN on 7/19/23 0931


   Last Action: Held


Discontinued Medications


Canagliflozin/Metformin HCl (Invokamet 50-1,000 mg Tablet) 50 Mg-1,000 Mg 

Tablet, 1 EACH PO BID, (Reported)


   Discontinued Reason: No Longer Taking


   Entered as Reported by: PELON BAIRDZANT on 7/19/23 0931


   Last Action: Discontinued





Past Medical-Social-Family Hx


Patient Social History


Marrital Status:  single





Review of Systems


Constitutional:  see HPI


Cardiovascular:  palpitations





Physical Exam


General Appearance:  No Apparent Distress, WD/WN, Chronically ill


Eyes:  Bilateral Eye Normal Inspection, Bilateral Eye PERRL, Bilateral Eye EOMI


HEENT:  PERRL/EOMI, Normal ENT Inspection, Pharynx Normal


Neck:  Full Range of Motion, Normal Inspection, Non Tender, Supple, Carotid 

Bruit


Respiratory:  Chest Non Tender, Lungs Clear, Normal Breath Sounds, No Accessory 

Muscle Use, No Respiratory Distress


Cardiovascular:  Regular Rate, Rhythm, No Edema, No Gallop, No JVD, No Murmur, 

Normal Peripheral Pulses


Gastrointestinal:  Normal Bowel Sounds, No Organomegaly, No Pulsatile Mass, Non 

Tender, Soft


Back:  Normal Inspection, No CVA Tenderness, No Vertebral Tenderness


Extremity:  Normal Capillary Refill, Normal Inspection, Normal Range of Motion, 

Non Tender, No Calf Tenderness, No Pedal Edema


Neurologic/Psychiatric:  Alert, Oriented x3, No Motor/Sensory Deficits, Normal 

Mood/Affect


Skin:  Normal Color, Warm/Dry


Lymphatic:  No Adenopathy





Short Stay Diagnosis


Discharge Diagnosis-Short Stay


Admission Diagnosis:  


AF RVR


Final Discharge Diagnosis:  


AF RVR





Conclusion


Conclusion/Plan


ICU


Cardiology management is appreciated





Supervisory-Addendum Brief


Verification & Attestation


Participated in pt care:  history, MDM, physical


Personally performed:  exam, history, MDM, supervision of care


Care discussed with:  Medical Student


Procedures:  n/a


Results interpretation:  Verified all documentation


Verification and Attestation of Medical Student E/M Service





A medical student performed and documented this service in my presence. I 

reviewed and verified all information documented by the medical student and made

modifications to such information, when appropriate. I personally performed the 

physical exam and medical decision making. 





 Lynn Louis Jul 21, 2023,20:50











RYLEE CROCKETT                   Jul 21, 2023 12:35


LYNN LOUIS DO                Jul 21, 2023 20:51

## 2023-07-22 ENCOUNTER — HOSPITAL ENCOUNTER (INPATIENT)
Dept: HOSPITAL 75 - IRF | Age: 63
LOS: 6 days | Discharge: HOME | DRG: 92 | End: 2023-07-28
Attending: INTERNAL MEDICINE | Admitting: INTERNAL MEDICINE
Payer: COMMERCIAL

## 2023-07-22 VITALS — WEIGHT: 240.3 LBS | HEIGHT: 66.93 IN | BODY MASS INDEX: 37.72 KG/M2

## 2023-07-22 VITALS — SYSTOLIC BLOOD PRESSURE: 114 MMHG | DIASTOLIC BLOOD PRESSURE: 61 MMHG

## 2023-07-22 VITALS — DIASTOLIC BLOOD PRESSURE: 70 MMHG | SYSTOLIC BLOOD PRESSURE: 132 MMHG

## 2023-07-22 DIAGNOSIS — I25.10: ICD-10-CM

## 2023-07-22 DIAGNOSIS — Z79.85: ICD-10-CM

## 2023-07-22 DIAGNOSIS — M19.90: ICD-10-CM

## 2023-07-22 DIAGNOSIS — Z79.84: ICD-10-CM

## 2023-07-22 DIAGNOSIS — D63.1: ICD-10-CM

## 2023-07-22 DIAGNOSIS — G47.33: ICD-10-CM

## 2023-07-22 DIAGNOSIS — E66.9: ICD-10-CM

## 2023-07-22 DIAGNOSIS — N18.9: ICD-10-CM

## 2023-07-22 DIAGNOSIS — E78.00: ICD-10-CM

## 2023-07-22 DIAGNOSIS — T81.31XD: ICD-10-CM

## 2023-07-22 DIAGNOSIS — Z95.2: ICD-10-CM

## 2023-07-22 DIAGNOSIS — I11.0: ICD-10-CM

## 2023-07-22 DIAGNOSIS — J44.9: ICD-10-CM

## 2023-07-22 DIAGNOSIS — I48.0: ICD-10-CM

## 2023-07-22 DIAGNOSIS — F32.A: ICD-10-CM

## 2023-07-22 DIAGNOSIS — Z79.899: ICD-10-CM

## 2023-07-22 DIAGNOSIS — Z79.82: ICD-10-CM

## 2023-07-22 DIAGNOSIS — I50.32: ICD-10-CM

## 2023-07-22 DIAGNOSIS — Z87.891: ICD-10-CM

## 2023-07-22 DIAGNOSIS — Z79.01: ICD-10-CM

## 2023-07-22 DIAGNOSIS — E11.22: ICD-10-CM

## 2023-07-22 DIAGNOSIS — E11.40: ICD-10-CM

## 2023-07-22 DIAGNOSIS — K21.9: ICD-10-CM

## 2023-07-22 DIAGNOSIS — G72.89: Primary | ICD-10-CM

## 2023-07-22 LAB
ALBUMIN SERPL-MCNC: 3.2 GM/DL (ref 3.2–4.5)
ALP SERPL-CCNC: 63 U/L (ref 40–136)
ALT SERPL-CCNC: 16 U/L (ref 0–55)
BASOPHILS # BLD AUTO: 0.1 10^3/UL (ref 0–0.1)
BASOPHILS NFR BLD AUTO: 1 % (ref 0–10)
BILIRUB SERPL-MCNC: 0.7 MG/DL (ref 0.1–1)
BUN/CREAT SERPL: 31
CALCIUM SERPL-MCNC: 9.3 MG/DL (ref 8.5–10.1)
CHLORIDE SERPL-SCNC: 101 MMOL/L (ref 98–107)
CO2 SERPL-SCNC: 27 MMOL/L (ref 21–32)
CREAT SERPL-MCNC: 1.02 MG/DL (ref 0.6–1.3)
EOSINOPHIL # BLD AUTO: 0.7 10^3/UL (ref 0–0.3)
EOSINOPHIL NFR BLD AUTO: 6 % (ref 0–10)
GFR SERPLBLD BASED ON 1.73 SQ M-ARVRAT: 62 ML/MIN
GLUCOSE SERPL-MCNC: 97 MG/DL (ref 70–105)
HCT VFR BLD CALC: 29 % (ref 35–52)
HGB BLD-MCNC: 8.4 G/DL (ref 11.5–16)
INR PPP: 2.9 (ref 0.8–1.4)
LYMPHOCYTES # BLD AUTO: 2.2 10^3/UL (ref 1–4)
LYMPHOCYTES NFR BLD AUTO: 18 % (ref 12–44)
MAGNESIUM SERPL-MCNC: 1.7 MG/DL (ref 1.6–2.4)
MANUAL DIFFERENTIAL PERFORMED BLD QL: NO
MCH RBC QN AUTO: 26 PG (ref 25–34)
MCHC RBC AUTO-ENTMCNC: 29 G/DL (ref 32–36)
MCV RBC AUTO: 89 FL (ref 80–99)
MONOCYTES # BLD AUTO: 1.1 10^3/UL (ref 0–1)
MONOCYTES NFR BLD AUTO: 9 % (ref 0–12)
NEUTROPHILS # BLD AUTO: 7.8 10^3/UL (ref 1.8–7.8)
NEUTROPHILS NFR BLD AUTO: 65 % (ref 42–75)
PHOSPHATE SERPL-MCNC: 3.7 MG/DL (ref 2.3–4.7)
PLATELET # BLD: 296 10^3/UL (ref 130–400)
PMV BLD AUTO: 9.1 FL (ref 9–12.2)
POTASSIUM SERPL-SCNC: 4.5 MMOL/L (ref 3.6–5)
PROT SERPL-MCNC: 6.4 GM/DL (ref 6.4–8.2)
PROTHROMBIN TIME: 30.2 SEC (ref 12.2–14.7)
SODIUM SERPL-SCNC: 139 MMOL/L (ref 135–145)
WBC # BLD AUTO: 11.9 10^3/UL (ref 4.3–11)

## 2023-07-22 PROCEDURE — 94640 AIRWAY INHALATION TREATMENT: CPT

## 2023-07-22 PROCEDURE — 36415 COLL VENOUS BLD VENIPUNCTURE: CPT

## 2023-07-22 PROCEDURE — 94760 N-INVAS EAR/PLS OXIMETRY 1: CPT

## 2023-07-22 PROCEDURE — 85025 COMPLETE CBC W/AUTO DIFF WBC: CPT

## 2023-07-22 PROCEDURE — 85007 BL SMEAR W/DIFF WBC COUNT: CPT

## 2023-07-22 PROCEDURE — 85027 COMPLETE CBC AUTOMATED: CPT

## 2023-07-22 PROCEDURE — 94761 N-INVAS EAR/PLS OXIMETRY MLT: CPT

## 2023-07-22 PROCEDURE — 85610 PROTHROMBIN TIME: CPT

## 2023-07-22 PROCEDURE — 82947 ASSAY GLUCOSE BLOOD QUANT: CPT

## 2023-07-22 PROCEDURE — 80053 COMPREHEN METABOLIC PANEL: CPT

## 2023-07-22 RX ADMIN — POTASSIUM CHLORIDE SCH MEQ: 750 TABLET, FILM COATED, EXTENDED RELEASE ORAL at 08:51

## 2023-07-22 RX ADMIN — MAGNESIUM SULFATE IN DEXTROSE SCH MLS/HR: 10 INJECTION, SOLUTION INTRAVENOUS at 06:20

## 2023-07-22 RX ADMIN — INSULIN ASPART SCH UNIT: 100 INJECTION, SOLUTION INTRAVENOUS; SUBCUTANEOUS at 11:30

## 2023-07-22 RX ADMIN — BUMETANIDE SCH MG: 1 TABLET ORAL at 08:51

## 2023-07-22 RX ADMIN — DOCUSATE SODIUM SCH MG: 100 CAPSULE ORAL at 16:17

## 2023-07-22 RX ADMIN — DILTIAZEM HYDROCHLORIDE SCH MG: 120 CAPSULE, COATED, EXTENDED RELEASE ORAL at 08:49

## 2023-07-22 RX ADMIN — PANTOPRAZOLE SCH MG: 20 TABLET, DELAYED RELEASE ORAL at 05:51

## 2023-07-22 RX ADMIN — METFORMIN HYDROCHLORIDE SCH MG: 500 TABLET, FILM COATED ORAL at 19:16

## 2023-07-22 RX ADMIN — POLYETHYLENE GLYCOL (3350) SCH GM: 17 POWDER, FOR SOLUTION ORAL at 16:17

## 2023-07-22 RX ADMIN — MAGNESIUM SULFATE IN DEXTROSE SCH MLS/HR: 10 INJECTION, SOLUTION INTRAVENOUS at 06:51

## 2023-07-22 RX ADMIN — DOCUSATE SODIUM AND SENNOSIDES SCH EA: 8.6; 5 TABLET, FILM COATED ORAL at 19:11

## 2023-07-22 RX ADMIN — POTASSIUM CHLORIDE SCH MEQ: 1500 TABLET, EXTENDED RELEASE ORAL at 05:40

## 2023-07-22 RX ADMIN — DOCUSATE SODIUM SCH MG: 100 CAPSULE ORAL at 19:11

## 2023-07-22 RX ADMIN — WARFARIN SODIUM SCH MG: 4 TABLET ORAL at 19:16

## 2023-07-22 RX ADMIN — SENNOSIDES SCH MG: 8.6 TABLET, FILM COATED ORAL at 08:56

## 2023-07-22 RX ADMIN — MAGNESIUM SULFATE IN DEXTROSE SCH MLS/HR: 10 INJECTION, SOLUTION INTRAVENOUS at 05:51

## 2023-07-22 RX ADMIN — METFORMIN HYDROCHLORIDE SCH MG: 500 TABLET, FILM COATED ORAL at 08:50

## 2023-07-22 RX ADMIN — DOCUSATE SODIUM AND SENNOSIDES SCH EA: 8.6; 5 TABLET, FILM COATED ORAL at 16:18

## 2023-07-22 RX ADMIN — POTASSIUM CHLORIDE SCH MLS/HR: 200 INJECTION, SOLUTION INTRAVENOUS at 05:41

## 2023-07-22 RX ADMIN — DOCUSATE SODIUM AND SENNOSIDES SCH EA: 8.6; 5 TABLET, FILM COATED ORAL at 08:56

## 2023-07-22 RX ADMIN — ASPIRIN SCH MG: 81 TABLET ORAL at 08:51

## 2023-07-22 RX ADMIN — MAGNESIUM SULFATE IN DEXTROSE SCH MLS/HR: 10 INJECTION, SOLUTION INTRAVENOUS at 08:49

## 2023-07-22 RX ADMIN — EMPAGLIFLOZIN SCH MG: 10 TABLET, FILM COATED ORAL at 08:50

## 2023-07-22 RX ADMIN — DOCUSATE SODIUM SCH MG: 100 CAPSULE ORAL at 08:55

## 2023-07-22 RX ADMIN — WARFARIN SODIUM SCH MG: 2.5 TABLET ORAL at 19:16

## 2023-07-22 RX ADMIN — INSULIN ASPART SCH UNIT: 100 INJECTION, SOLUTION INTRAVENOUS; SUBCUTANEOUS at 05:42

## 2023-07-22 RX ADMIN — MAGNESIUM SULFATE IN DEXTROSE SCH MLS/HR: 10 INJECTION, SOLUTION INTRAVENOUS at 11:17

## 2023-07-22 RX ADMIN — POTASSIUM CHLORIDE SCH MEQ: 750 TABLET, FILM COATED, EXTENDED RELEASE ORAL at 20:11

## 2023-07-22 RX ADMIN — BUMETANIDE SCH MG: 1 TABLET ORAL at 20:11

## 2023-07-22 RX ADMIN — POLYETHYLENE GLYCOL (3350) SCH GM: 17 POWDER, FOR SOLUTION ORAL at 19:11

## 2023-07-22 NOTE — PM&R POST ADMISSION ASSESSMENT
PM&R 


Date of Visit:  Jul 22, 2023


Time of Visit:  14:00


History of Present Illness


CC: CHF myopathy





HPI: This is a 63yoWF who was just admitted to ARU and day after required ICU 

transfer due to AF RVR who now returns for rehab. See below for details:





Jessica Gomez is a 64yo F who was a patient on the inpatient rehab unit 

before developing palpitations and tachycardia on the evening of 7/20. She was 

found to be in afib with RVR and transferred to the ICU. 


HPI from admission to inpatient rehab on 7/19





"Jessica Gomez is a 63yoF who presents to acute rehab unit on 7/19 for 

intensive rehabilitation related to her myopathy after undergoing MVR 

replacement at  for decompensated heart failure. She was hospitalized at  in

late June after experiencing progressively worsening shortness of breath, 

swelling, and weight gain. She was extensively volume overloaded and required 

dialysis as an inpatient. She was initially diagnosed with mitral valve stenosis

in march of 2022 and was started on warfarin due to level of stenosis but was 

lost to follow up. Her symptoms began worsening in early June 2023 which 

ultimately lead to her admission to . She required hemodynamic support while 

hospitalized and undergoing dialysis as well with milrinone and vasopressin. 

After successful volume normalization and achieving hemodynamic stability she 

underwent tissue Mitral valve replacement on July 11. She developed atrial 

fibrillation after her procedure and was started on amiodarone with conversion 

to sinus rhythm. Today she reports much improvement of her shortness of breath 

and peripheral edema since her hospitalization. She denies chest pain aside from

tenderness at her surgical site. She denies palpitations. Reports her lower 

extremity edema is back to prehospital baseline, trace edema present 

bilaterally. She is tolerating diet without N/V/D. Denies fevers and chills."





She was doing well on the inpatient rehab unit and progressing with therapy u

ntil the evening of 7/20 when she began to feel palpitations and tachycardia. 

She called the nurse who confirmed a fast heart rate. EKG shortly after revealed

her to be in atrial fibrillation with rapid ventricular response, rate in the 

130s. Arrangements were made for her to be transferred to the ICU. She was 

started on a cardizem drip and converted to sinus rhythm with rate in the 80s 

overnight. On my exam she remained in sinus rhythm with rate anywhere from 75-

85. The drip was stopped and she was given her first dose of oral cardizem this 

morning. She denies any more palpitations and does not have chest pain, 

shortness of breath, lightheadedness, syncope, or fever/chills. She has a 

history of afib with rvr around her mitral valve replacement surgery. She does 

not believe she was cardioverted at that time. She has been on an amiodarone 

taper since with no other episodes of palpitations. She is in good spirits this 

morning but is somewhat dejected because she thought she was improving in rehab 

and does not want any more setbacks. She is tolerating her diet without N/V/D. 

Denies any abdominal pain. She reports willingness to return to inpatient rehab 

soon pending insurance approval.








Uneventful course after she was transferred to ICU and DC from ARU due to AF 

RVR. Cardizem drip helped return NSR. Overall she did well and had no other 

events and she was DC to ARU.





Past Medical-Social-Family Hx


Past Med/Social Hx:  Reviewed Nursing Past Med/Soc Hx, Reviewed and Corrections 

made


Patient Social History


Marrital Status:  single


Employed/Student:  employed


Alcohol Use:  Denies Use


Smoking Status:  Former Smoker





Past Medical History


Surgeries:  Open Heart Surgery


Respiratory:  COPD, Sleep Apnea


Currently Using CPAP:  No


Currently Using BIPAP:  No


Cardiac:  Atrial Fibrillation, Coronary Artery Disease, High Cholesterol, 

Hypertension, Valvular Heart Disease


Neurological:  Neuropathy


Genitourinary:  Bladder Infection


Gastrointestinal:  Gastroesophageal Reflux


Musculoskeletal:  Arthritis, Chronic Back Pain


Endocrine:  Diabetes, Non-Insulin dep


Psychosocial:  Anxiety, Depression





Family History


Heart Disease





Occupation:  chart  for [employer]





PM&R Allergy/Meds/Data Review


Allergies


Coded Allergies:  


     No Allergy Information Available (Unverified , 3/3/22)





Home Medications


Scheduled


Acetaminophen (Tylenol Extra Strength), 1,000 MG PO Q6H, (Reported)


Amiodarone HCl (Amiodarone HCl), 200 MG PO DAILY


Ascorbic Acid (Vitamin C), 1,000 MG PO DAILY, (Reported)


Aspirin (Aspirin EC), 81 MG PO DAILY, (Reported)


Atorvastatin Calcium (Atorvastatin Calcium), 20 MG PO HS, (Reported)


Budesonide/Formoterol Fumarate (Symbicort 80-4.5 Mcg Inhaler), 2 PUFF IH BID, 

(Reported)


Bumetanide (Bumetanide), 2 MG PO BID, (Reported)


Canagliflozin/Metformin HCl (Invokamet Xr 150-1,000 mg Tab), 1 EA PO DAILY, 

(Reported)


Diltiazem HCl (Diltiazem 24Hr ER), 120 MG PO DAILY


Dulaglutide (Trulicity), 0.75 MG SQ WEEK, (Reported)


Omeprazole (Omeprazole), 20 MG PO DAILY, (Reported)


Potassium Chloride (K-Tab ER), 10 MEQ PO BID, (Reported)


Sennosides/Docusate Sodium (Senna-S Tablet), 2 EACH PO BID, (Reported)


Vilazodone Hydrochloride (Viibryd), 40 MG PO DAILY, (Reported)


Warfarin Sodium (Warfarin Sodium), 4 MG PO DAILY@1800


Warfarin Sodium (Warfarin Sodium), 2.5 MG PO DAILY@1800





Scheduled PRN


Albuterol Sulfate (Ventolin Hfa), 1-2 PUFF INH TID PRN for SHORTNESS OF BREATH, 

(Reported)


Oxycodone HCl/Acetaminophen (Percocet 5-325 mg Tablet), 1-2 TAB PO Q6H PRN for 

PAIN-MODERATE (5-7), (Reported)





Discontinued Medications


Amiodarone HCl (Amiodarone HCl), 400 MG PO DAILY, (Reported)


Amiodarone HCl (Amiodarone HCl), 200 MG PO BID, (Reported)


Amiodarone HCl (Amiodarone HCl), 200 MG PO DAILY, (Reported)


Canagliflozin/Metformin HCl (Invokamet 50-1,000 mg Tablet), 1 EACH PO BID, 

(Reported)


   Discontinued Reason: No Longer Taking


Metoprolol Tartrate (Metoprolol Tartrate), 12.5 MG PO BID, (Reported)


Warfarin Sodium (Warfarin Sodium), 5 MG PO CARDOZA,SA, (Reported)


Warfarin Sodium (Warfarin Sodium), 7.5 MG PO MO,TU,WE,TH,FR, (Reported)





Current Medications


Current Medications


Reviewed





Review of Systems


Constitutional:  see HPI, malaise, weakness


EENTM:  no symptoms reported


Respiratory:  dyspnea on exertion


Cardiovascular:  no symptoms reported


Gastrointestinal:  no symptoms reported


Genitourinary:  no symptoms reported


Musculoskeletal:  back pain, joint pain


Skin:  no symptoms reported


Psychiatric/Neurological:  Anxiety, Depressed


All Other Systems Reviewed


Negative Unless Noted:  Yes





Physical Exam


Physical Exam


Vital Signs


Capillary Refill :


Height, Weight, BMI


Height: '"


Weight: lbs. oz. kg; 41.59 BMI


Method:


General Appearance:  No Apparent Distress, WD/WN, Chronically ill, Obese


Eyes:  Bilateral Eye Normal Inspection, Bilateral Eye PERRL


HEENT:  PERRL/EOMI, Normal ENT Inspection, Pharynx Normal


Neck:  Full Range of Motion, Normal Inspection, Non Tender, Supple, Carotid 

Bruit


Respiratory:  Chest Non Tender, Lungs Clear, Normal Breath Sounds, No Accessory 

Muscle Use, No Respiratory Distress


Cardiovascular:  Regular Rate, Rhythm, No Edema, No Gallop, No JVD, No Murmur, 

Normal Peripheral Pulses


Gastrointestinal:  Normal Bowel Sounds, No Organomegaly, No Pulsatile Mass, Non 

Tender, Soft


Back:  Normal Inspection, No CVA Tenderness, No Vertebral Tenderness


Extremity:  Normal Capillary Refill, Normal Inspection, Normal Range of Motion, 

Non Tender, No Calf Tenderness, No Pedal Edema


Neurologic/Psychiatric:  Alert, Oriented x3, CNs II-XII Norm as Tested, Abnormal

Gait, Depressed Affect, Motor Weakness (generalized)


Skin:  Normal Color, Warm/Dry


Lymphatic:  No Adenopathy





PM&R Medical Assessment & Plan


REHAB IMPAIRMENT GROUP: 


CHF myopathy





ETIOLOGIC DIAGNOSIS: 


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





The comorbidities that impact the patients function and/or functional outcome 

by: recent dialysis required, dyspnea, hypoxia, severe weakness, recent open 

heart surgery





REHAB PLAN:


The patient is being admitted to our comprehensive inpatient rehabilitation 

facility and can tolerate the intensity of service consisting of at least:


      180 minutes of therapy a day, 5 out of 7 days a week 


    


Rehab treatment will consist of:   PT OT will focus on regaining strength with 

use of AD in order to gain stamina with ambulation and provide techniques to 

increase independence in ADL's





The patient/family has a good understanding of our discharge process and will 

benefit from an interdisciplinary inpatient rehabilitation program. The patient 

has potential to make improvement and is in need of at least two of the 

following multidisciplinary therapies including but not limited to physical, 

occupational, speech, and prosthetics and orthotics.  Additionally the patient 

will need services from respiratory, nutritional services, wound care, 

psychology, etc. (Customize this to each patient). Given the patients complex 

condition and risk of further medical complications, rehabilitation services 

cannot be safely or effectively provided at a lower level of care such as a 

skilled nursing facility.





BARRIERS TO DISCHARGE:


Severe weakness and depression





ESTIMATED LOS:


10 days





DISPOSITION:


Home





RELEVANT CHANGES SINCE PREADMISSION SCREENING: 


I have compared the patients medical and functional status at the time of the 

preadmission screening and there are: 


      no changes





PROGNOSIS:


Good





REHABILITATION GOALS:  


1.  PT OT will focus on regaining strength with use of AD in order to gain 

stamina with ambulation and provide techniques to increase independence in ADL's








All the above goals were reviewed with the patient and he/she is in agreement.


By signing this document, I acknowledge that I have personally performed a full 

physical examination on this patient within 24 hours of admission to this 

inpatient rehabilitation facility and have determined the patient to be able to 

tolerate the above course of treatment at an intensive level for a reasonable 

period of time. I will be completing a detailed individualized Plan of Care for 

this patient by day #4 of the patients stay based upon the Preadmission Screen,

the Post-Admission Evaluation, and the therapy evaluations.


Admission Dx/Comorbidities:  


(1) Myopathy


ICD Codes:  G72.9 - Myopathy, unspecified





Assessment/Plan


Assessment and Plan


Assess & Plan/Chief Complaint


CHF myopathy


Atrial fibrillation with RVR


S/P MVR


Chronic HFpEF


HTN


HLD


T2DM


COPD


OMEGA


Depression





Plan:


Meds transferred from ICU


Monitor INR


Monitor HR


Appreciate AUGUST Jackson DO                Jul 22, 2023 09:00

## 2023-07-22 NOTE — DISCHARGE SUMMARY
Diagnosis/Chief Complaint


Date of Admission


Jul 20, 2023 at 18:09


Date of Discharge





Discharge Date:  Jul 22, 2023


Discharge Diagnosis


Atrial fibrillation with RVR


   Cardiology consulted appreciate recs


   Rate in the 130s when admitted to ICU


   Started on cardizem drip with conversion to normal sinus rhythm


   Drip stopped, started on Cardizem 120mg PO daily


   remains sinus


   Anticoagulated with warfarin, recently increased to 6.5 daily due to 

subtherapuetic INR


   monitor INR, goal of 2.5-3.5 due to mitral valve replacement


   Continue tele


   Was in the middle of an amiodarone taper after her first bout of afib with 

RVR at  earlier this month





CHF myopathy


S/p MVR


Chronic HFpEF


   Intense PT/OT


   Social work following for discharge planning


   Currently hemodynamically stable, monitor vitals


   Continue Bumex 2mg BID, metoprolol 12.5mg BID,  aspirin 81 mg daily, 

atorvastatin 20mg daily


   Monitor CBC, CMP, PT, INR


   Encourage LE elevation and LE compression


   Sodium restricted diet 2gm per day


   Sternal wound precautions


   


   


HTN


HLD


   home meds as above





T2DM


   Continue canagliflozin/metfomin, and dulaglutide


   accuchecks


   SSI





COPD


OMEGA


   Satting well on room air


   Does not tolerate CPAP machine use


   Daily Breo Ellipta


   Albuterol prn





Depression


   continue viibryd 40mg daily





Diet- carb consistent with sodium restriction


DVT ppx- anticoagulated with warfarin, continue to monitor INR


Code- full code


Dispo- attempting to readmit to inpatient rehab. Waiting on transfer vs having 

to complete insurance acceptance process over again





Reason Hospital Visit


CC: AF RVR





Discharge Summary


Discharge Physical Examination


Allergies:  


Coded Allergies:  


     No Allergy Information Available (Unverified , 3/3/22)


Vitals & I&Os





Vital Signs








  Date Time  Temp Pulse Resp B/P (MAP) Pulse Ox O2 Delivery O2 Flow Rate FiO2


 


7/22/23 12:14  94      


 


7/22/23 10:00    121/73 (85) 94 Nasal Cannula 2.00 


 


7/22/23 08:55 36.5       


 


7/22/23 05:00   20     


 


7/20/23 21:06        21








General Appearance:  Alert, Oriented X3, Cooperative


Respiratory:  Clear to Auscultation


Cardiovascular:  Regular Rate


Psych/Mental Status:  Mental Status NL





Hospital Course


Was the Problem List Reviewed?:  Yes


Reason for visit/HPI


Jessica Gomez is a 64yo F who was a patient on the inpatient rehab unit 

before developing palpitations and tachycardia on the evening of 7/20. She was 

found to be in afib with RVR and transferred to the ICU. 


HPI from admission to inpatient rehab on 7/19





"Jessica Gomez is a 63yoF who presents to acute rehab unit on 7/19 for 

intensive rehabilitation related to her myopathy after undergoing MVR 

replacement at  for decompensated heart failure. She was hospitalized at  in

late June after experiencing progressively worsening shortness of breath, 

swelling, and weight gain. She was extensively volume overloaded and required 

dialysis as an inpatient. She was initially diagnosed with mitral valve stenosis

in march of 2022 and was started on warfarin due to level of stenosis but was 

lost to follow up. Her symptoms began worsening in early June 2023 which 

ultimately lead to her admission to . She required hemodynamic support while 

hospitalized and undergoing dialysis as well with milrinone and vasopressin. 

After successful volume normalization and achieving hemodynamic stability she 

underwent tissue Mitral valve replacement on July 11. She developed atrial 

fibrillation after her procedure and was started on amiodarone with conversion 

to sinus rhythm. Today she reports much improvement of her shortness of breath 

and peripheral edema since her hospitalization. She denies chest pain aside from

tenderness at her surgical site. She denies palpitations. Reports her lower 

extremity edema is back to prehospital baseline, trace edema present 

bilaterally. She is tolerating diet without N/V/D. Denies fevers and chills."





She was doing well on the inpatient rehab unit and progressing with therapy 

until the evening of 7/20 when she began to feel palpitations and tachycardia. 

She called the nurse who confirmed a fast heart rate. EKG shortly after revealed

her to be in atrial fibrillation with rapid ventricular response, rate in the 

130s. Arrangements were made for her to be transferred to the ICU. She was 

started on a cardizem drip and converted to sinus rhythm with rate in the 80s 

overnight. On my exam she remained in sinus rhythm with rate anywhere from 75-

85. The drip was stopped and she was given her first dose of oral cardizem this 

morning. She denies any more palpitations and does not have chest pain, shor

tness of breath, lightheadedness, syncope, or fever/chills. She has a history of

afib with rvr around her mitral valve replacement surgery. She does not believe 

she was cardioverted at that time. She has been on an amiodarone taper since 

with no other episodes of palpitations. She is in good spirits this morning but 

is somewhat dejected because she thought she was improving in rehab and does not

want any more setbacks. She is tolerating her diet without N/V/D. Denies any 

abdominal pain. She reports willingness to return to inpatient rehab soon 

pending insurance approval.








Uneventful course after she was transferred to ICU and DC from ARU due to AF 

RVR. Cardizem drip helped return NSR. Overall she did well and had no other 

events and she was DC to ARU.


Labs (last 24 hrs)


Laboratory Tests


7/20/23 21:10: Glucometer 144H


7/21/23 05:27: 


White Blood Count 11.3H, Red Blood Count 3.31L, Hemoglobin 8.6L, Hematocrit 30L,

Mean Corpuscular Volume 89, Mean Corpuscular Hemoglobin 26, Mean Corpuscular 

Hemoglobin Concent 29L, Red Cell Distribution Width 27.1H, Platelet Count 308, 

Mean Platelet Volume 9.3, Immature Granulocyte % (Auto) 1, Neutrophils (%) 

(Auto) 63, Lymphocytes (%) (Auto) 20, Monocytes (%) (Auto) 10, Eosinophils (%) 

(Auto) 6, Basophils (%) (Auto) 1, Neutrophils # (Auto) 7.1, Lymphocytes # (Auto)

2.3, Monocytes # (Auto) 1.1H, Eosinophils # (Auto) 0.6H, Basophils # (Auto) 0.1,

Immature Granulocyte # (Auto) 0.1, Prothrombin Time 24.6H, INR Comment 2.2H, 

Sodium Level 137, Potassium Level 4.4, Chloride Level 100, Carbon Dioxide Level 

25, Anion Gap 12, Blood Urea Nitrogen 40H, Creatinine 1.12, Estimat Glomerular 

Filtration Rate 56, BUN/Creatinine Ratio 36, Glucose Level 118H, Calcium Level 

9.2, Corrected Calcium 9.8, Phosphorus Level 4.0, Magnesium Level 1.5L, Total 

Bilirubin 0.7, Aspartate Amino Transf (AST/SGOT) 28, Alanine Aminotransferase 

(ALT/SGPT) 16, Alkaline Phosphatase 60, Total Protein 6.3L, Albumin 3.3


7/21/23 10:43: Glucometer 104


7/21/23 16:35: Glucometer 101


7/21/23 20:36: Glucometer 119H


7/22/23 05:03: 


White Blood Count 11.9H, Red Blood Count 3.22L, Hemoglobin 8.4L, Hematocrit 29L,

Mean Corpuscular Volume 89, Mean Corpuscular Hemoglobin 26, Mean Corpuscular 

Hemoglobin Concent 29L, Red Cell Distribution Width 26.9H, Platelet Count 296, 

Mean Platelet Volume 9.1, Immature Granulocyte % (Auto) 1, Neutrophils (%) 

(Auto) 65, Lymphocytes (%) (Auto) 18, Monocytes (%) (Auto) 9, Eosinophils (%) 

(Auto) 6, Basophils (%) (Auto) 1, Neutrophils # (Auto) 7.8, Lymphocytes # (Auto)

2.2, Monocytes # (Auto) 1.1H, Eosinophils # (Auto) 0.7H, Basophils # (Auto) 0.1,

Immature Granulocyte # (Auto) 0.1, Prothrombin Time 30.2H, INR Comment 2.9H, 

Sodium Level 139, Potassium Level 4.5, Chloride Level 101, Carbon Dioxide Level 

27, Anion Gap 11, Blood Urea Nitrogen 32H, Creatinine 1.02, Estimat Glomerular 

Filtration Rate 62, BUN/Creatinine Ratio 31, Glucose Level 97, Calcium Level 

9.3, Corrected Calcium 9.9, Phosphorus Level 3.7, Magnesium Level 1.7, Total 

Bilirubin 0.7, Aspartate Amino Transf (AST/SGOT) 26, Alanine Aminotransferase 

(ALT/SGPT) 16, Alkaline Phosphatase 63, Total Protein 6.4, Albumin 3.2


7/22/23 11:06: Glucometer 102





Pending Labs


Laboratory Tests


7/20/23 21:10: Glucometer 144


7/21/23 05:27: 


White Blood Count 11.3, Red Blood Count 3.31, Hemoglobin 8.6, Hematocrit 30, 

Mean Corpuscular Volume 89, Mean Corpuscular Hemoglobin 26, Mean Corpuscular 

Hemoglobin Concent 29, Red Cell Distribution Width 27.1, Platelet Count 308, 

Mean Platelet Volume 9.3, Immature Granulocyte % (Auto) 1, Neutrophils (%) 

(Auto) 63, Lymphocytes (%) (Auto) 20, Monocytes (%) (Auto) 10, Eosinophils (%) 

(Auto) 6, Basophils (%) (Auto) 1, Neutrophils # (Auto) 7.1, Lymphocytes # (Auto)

2.3, Monocytes # (Auto) 1.1, Eosinophils # (Auto) 0.6, Basophils # (Auto) 0.1, 

Immature Granulocyte # (Auto) 0.1, Prothrombin Time 24.6, INR Comment 2.2, 

Sodium Level 137, Potassium Level 4.4, Chloride Level 100, Carbon Dioxide Level 

25, Anion Gap 12, Blood Urea Nitrogen 40, Creatinine 1.12, Estimat Glomerular 

Filtration Rate 56, BUN/Creatinine Ratio 36, Glucose Level 118, Calcium Level 

9.2, Corrected Calcium 9.8, Phosphorus Level 4.0, Magnesium Level 1.5, Total 

Bilirubin 0.7, Aspartate Amino Transf (AST/SGOT) 28, Alanine Aminotransferase 

(ALT/SGPT) 16, Alkaline Phosphatase 60, Total Protein 6.3, Albumin 3.3


7/21/23 10:43: Glucometer 104


7/21/23 16:35: Glucometer 101


7/21/23 20:36: Glucometer 119


7/22/23 05:03: 


White Blood Count 11.9, Red Blood Count 3.22, Hemoglobin 8.4, Hematocrit 29, M

judi Corpuscular Volume 89, Mean Corpuscular Hemoglobin 26, Mean Corpuscular 

Hemoglobin Concent 29, Red Cell Distribution Width 26.9, Platelet Count 296, 

Mean Platelet Volume 9.1, Immature Granulocyte % (Auto) 1, Neutrophils (%) 

(Auto) 65, Lymphocytes (%) (Auto) 18, Monocytes (%) (Auto) 9, Eosinophils (%) 

(Auto) 6, Basophils (%) (Auto) 1, Neutrophils # (Auto) 7.8, Lymphocytes # (Auto)

2.2, Monocytes # (Auto) 1.1, Eosinophils # (Auto) 0.7, Basophils # (Auto) 0.1, 

Immature Granulocyte # (Auto) 0.1, Prothrombin Time 30.2, INR Comment 2.9, 

Sodium Level 139, Potassium Level 4.5, Chloride Level 101, Carbon Dioxide Level 

27, Anion Gap 11, Blood Urea Nitrogen 32, Creatinine 1.02, Estimat Glomerular 

Filtration Rate 62, BUN/Creatinine Ratio 31, Glucose Level 97, Calcium Level 

9.3, Corrected Calcium 9.9, Phosphorus Level 3.7, Magnesium Level 1.7, Total 

Bilirubin 0.7, Aspartate Amino Transf (AST/SGOT) 26, Alanine Aminotransferase 

(ALT/SGPT) 16, Alkaline Phosphatase 63, Total Protein 6.4, Albumin 3.2


7/22/23 11:06: Glucometer 102








Discharge


Home Medications:





Active Scripts


Active


Diltiazem 24Hr ER (Diltiazem HCl) 120 Mg Cap.er.24h 120 Mg PO DAILY 30 Days


Amiodarone HCl 200 Mg Tablet 200 Mg PO DAILY


Warfarin Sodium 2.5 Mg Tablet 2.5 Mg PO DAILY@1800 30 Days


Warfarin Sodium 4 Mg Tablet 4 Mg PO DAILY@1800 30 Days


Reported


Invokamet Xr 150-1,000 mg Tab (Canagliflozin/Metformin HCl) 150 Mg-1,000 Mg 

Tab.bp.24h 1 Ea PO DAILY


Viibryd (Vilazodone Hydrochloride) 40 Mg Tablet 40 Mg PO DAILY


Omeprazole 20 Mg Tab.rap.dr 20 Mg PO DAILY


     TAKE BEFORE BREAKFAST


Trulicity (Dulaglutide) 0.75 Mg/0.5 Ml Pen.injctr 0.75 Mg SQ WEEK


Bumetanide 2 Mg Tablet 2 Mg PO BID


Symbicort 80-4.5 Mcg Inhaler (Budesonide/Formoterol Fumarate) 80 Mcg-4.5 

Mcg/Actuation Hfa.aer.ad 2 Puff IH BID


Atorvastatin Calcium 20 Mg Tablet 20 Mg PO HS


Aspirin EC (Aspirin) 81 Mg Tablet.dr 81 Mg PO DAILY


Vitamin C (Ascorbic Acid) 1,000 Mg Tablet 1,000 Mg PO DAILY


Ventolin Hfa (Albuterol Sulfate) 90 Mcg Hfa.aer.ad 1-2 Puff INH TID PRN


Senna-S Tablet (Sennosides/Docusate Sodium) 8.6 Mg-50 Mg Tablet 2 Each PO BID


K-Tab ER (Potassium Chloride) 10 Meq Tablet.er 10 Meq PO BID


Percocet 5-325 mg Tablet (Oxycodone HCl/Acetaminophen) 1 Each Tablet 1-2 Tab PO 

Q6H PRN MDD 6 TABS


Tylenol Extra Strength (Acetaminophen) 500 Mg Tablet 1,000 Mg PO Q6H





Instructions to patient/family


Please see electronic discharge instructions given to patient.











AUGUST LOUIS DO                Jul 22, 2023 09:00

## 2023-07-22 NOTE — CARDIOLOGY PROGRESS NOTE
Cardiology SOAP Progress Note


Subjective:


Continues to be in sinus rhythm





Objective:


I&O/Vital Signs











 7/22/23 7/22/23 7/22/23 7/22/23





 04:00 04:12 05:00 06:00


 


Temp 35.9   


 


Pulse 84  87 84


 


Resp   20 


 


B/P (MAP) 113/58 (74)  114/69 (83) 117/59 (78)


 


Pulse Ox 100 99 99 91


 


O2 Delivery Nasal Cannula Room Air Nasal Cannula Nasal Cannula


 


O2 Flow Rate 2.00  2.00 2.00


 


    





 7/22/23 7/22/23 7/22/23 7/22/23





 07:00 07:19 08:00 08:00


 


Pulse 85 84  96


 


B/P (MAP) 120/71 (89)   


 


Pulse Ox 98  96 99


 


O2 Delivery Nasal Cannula  Room Air Nasal Cannula


 


O2 Flow Rate 2.00   2.00





 7/22/23 7/22/23 7/22/23 7/22/23





 08:55 09:00 10:00 12:00


 


Temp 36.5   


 


Pulse  94 93 


 


B/P (MAP)  92/65 (73) 121/73 (85) 


 


Pulse Ox  96 94 97


 


O2 Delivery Room Air Nasal Cannula Nasal Cannula Room Air


 


O2 Flow Rate  2.00 2.00 


 


    





 7/22/23 7/22/23 7/22/23 





 12:00 12:14 13:20 


 


Temp 36.4   


 


Pulse  94  


 


B/P (MAP)    














 7/22/23





 00:00


 


Intake Total 1300 ml


 


Balance 1300 ml








Constitutional:  AAO x 3


Respiratory:  lungs clear to auscultation


Cardiovascular:  regular rate-rhythm


Gastrointestional:  soft


Neurologic/Psychiatric:  no motor/sensory deficits, alert, normal mood/affect, 

oriented x 3


Skin:  normal color





Results/Procedures:


Labs


Laboratory Tests


7/21/23 16:35: Glucometer 101


7/21/23 20:36: Glucometer 119H


7/22/23 05:03: 


White Blood Count 11.9H, Red Blood Count 3.22L, Hemoglobin 8.4L, Hematocrit 29L,

Mean Corpuscular Volume 89, Mean Corpuscular Hemoglobin 26, Mean Corpuscular 

Hemoglobin Concent 29L, Red Cell Distribution Width 26.9H, Platelet Count 296, 

Mean Platelet Volume 9.1, Immature Granulocyte % (Auto) 1, Neutrophils (%) 

(Auto) 65, Lymphocytes (%) (Auto) 18, Monocytes (%) (Auto) 9, Eosinophils (%) 

(Auto) 6, Basophils (%) (Auto) 1, Neutrophils # (Auto) 7.8, Lymphocytes # (Auto)

2.2, Monocytes # (Auto) 1.1H, Eosinophils # (Auto) 0.7H, Basophils # (Auto) 0.1,

Immature Granulocyte # (Auto) 0.1, Prothrombin Time 30.2H, INR Comment 2.9H, 

Sodium Level 139, Potassium Level 4.5, Chloride Level 101, Carbon Dioxide Level 

27, Anion Gap 11, Blood Urea Nitrogen 32H, Creatinine 1.02, Estimat Glomerular 

Filtration Rate 62, BUN/Creatinine Ratio 31, Glucose Level 97, Calcium Level 

9.3, Corrected Calcium 9.9, Phosphorus Level 3.7, Magnesium Level 1.7, Total 

Bilirubin 0.7, Aspartate Amino Transf (AST/SGOT) 26, Alanine Aminotransferase 

(ALT/SGPT) 16, Alkaline Phosphatase 63, Total Protein 6.4, Albumin 3.2


7/22/23 11:06: Glucometer 102








A/P:


Assessment/Dx:


afib rvr -converted to sinus rhythm


h/o mvr


Mitral stenosis,


History of congestive heart failure,


Diabetes,


Hypertension,


Hyperlipidemia


Plan:


Paroxysmal atrial fibrillation: Converted to sinus rhythm.  Change Cardizem 

infusion to Cardizem  mg daily.  amiodarone 200 mg daily.  Continue 

warfarin.  Follow-up as an outpatient with cardiology.  Patient does not want to

travel to [place] or [place].  Recommend following with either Dr. Stewart or 

Dr. Santamaria in [place].


history of MVR,


Chronic oral anticoagulation, continue warfarin.


Diabetes, hypertension, hyperlipidemia: Deferred to the primary team.











POLO CAVAZOS MD             Jul 22, 2023 15:26

## 2023-07-23 VITALS — SYSTOLIC BLOOD PRESSURE: 117 MMHG | DIASTOLIC BLOOD PRESSURE: 67 MMHG

## 2023-07-23 VITALS — DIASTOLIC BLOOD PRESSURE: 56 MMHG | SYSTOLIC BLOOD PRESSURE: 110 MMHG

## 2023-07-23 LAB
%HYPO/RBC NFR BLD AUTO: SLIGHT %
ALBUMIN SERPL-MCNC: 3.3 GM/DL (ref 3.2–4.5)
ALP SERPL-CCNC: 78 U/L (ref 40–136)
ALT SERPL-CCNC: 18 U/L (ref 0–55)
ANISOCYTOSIS BLD QL SMEAR: SLIGHT
BASOPHILS # BLD AUTO: 0.2 10^3/UL (ref 0–0.1)
BASOPHILS NFR BLD AUTO: 1 % (ref 0–10)
BILIRUB SERPL-MCNC: 0.6 MG/DL (ref 0.1–1)
BUN/CREAT SERPL: 31
CALCIUM SERPL-MCNC: 9.2 MG/DL (ref 8.5–10.1)
CHLORIDE SERPL-SCNC: 100 MMOL/L (ref 98–107)
CO2 SERPL-SCNC: 26 MMOL/L (ref 21–32)
CREAT SERPL-MCNC: 0.91 MG/DL (ref 0.6–1.3)
EOSINOPHIL # BLD AUTO: 0.7 10^3/UL (ref 0–0.3)
EOSINOPHIL NFR BLD AUTO: 5 % (ref 0–10)
EOSINOPHIL NFR BLD MANUAL: 4 %
GFR SERPLBLD BASED ON 1.73 SQ M-ARVRAT: 71 ML/MIN
GLUCOSE SERPL-MCNC: 103 MG/DL (ref 70–105)
HCT VFR BLD CALC: 30 % (ref 35–52)
HGB BLD-MCNC: 8.8 G/DL (ref 11.5–16)
INR PPP: 2.8 (ref 0.8–1.4)
LYMPHOCYTES # BLD AUTO: 1.8 10^3/UL (ref 1–4)
LYMPHOCYTES NFR BLD AUTO: 13 % (ref 12–44)
MANUAL DIFFERENTIAL PERFORMED BLD QL: YES
MCH RBC QN AUTO: 26 PG (ref 25–34)
MCHC RBC AUTO-ENTMCNC: 29 G/DL (ref 32–36)
MCV RBC AUTO: 90 FL (ref 80–99)
MONOCYTES # BLD AUTO: 1 10^3/UL (ref 0–1)
MONOCYTES NFR BLD AUTO: 7 % (ref 0–12)
MONOCYTES NFR BLD: 5 %
NEUTROPHILS # BLD AUTO: 10.3 10^3/UL (ref 1.8–7.8)
NEUTROPHILS NFR BLD AUTO: 73 % (ref 42–75)
NEUTS BAND NFR BLD MANUAL: 73 %
PLATELET # BLD: 300 10^3/UL (ref 130–400)
PMV BLD AUTO: 8.8 FL (ref 9–12.2)
POTASSIUM SERPL-SCNC: 4.7 MMOL/L (ref 3.6–5)
PROT SERPL-MCNC: 6.5 GM/DL (ref 6.4–8.2)
PROTHROMBIN TIME: 29.3 SEC (ref 12.2–14.7)
SODIUM SERPL-SCNC: 136 MMOL/L (ref 135–145)
VARIANT LYMPHS NFR BLD MANUAL: 18 %
WBC # BLD AUTO: 14.1 10^3/UL (ref 4.3–11)

## 2023-07-23 RX ADMIN — FLUTICASONE FUROATE AND VILANTEROL TRIFENATATE SCH EACH: 100; 25 POWDER RESPIRATORY (INHALATION) at 08:28

## 2023-07-23 RX ADMIN — CARVEDILOL SCH EA: 25 TABLET, FILM COATED ORAL at 10:43

## 2023-07-23 RX ADMIN — DOCUSATE SODIUM AND SENNOSIDES SCH EA: 8.6; 5 TABLET, FILM COATED ORAL at 19:43

## 2023-07-23 RX ADMIN — POLYETHYLENE GLYCOL (3350) SCH GM: 17 POWDER, FOR SOLUTION ORAL at 19:42

## 2023-07-23 RX ADMIN — WARFARIN SODIUM SCH MG: 2.5 TABLET ORAL at 17:58

## 2023-07-23 RX ADMIN — INSULIN ASPART SCH UNIT: 100 INJECTION, SOLUTION INTRAVENOUS; SUBCUTANEOUS at 21:01

## 2023-07-23 RX ADMIN — POLYETHYLENE GLYCOL (3350) SCH GM: 17 POWDER, FOR SOLUTION ORAL at 08:00

## 2023-07-23 RX ADMIN — AMIODARONE HYDROCHLORIDE SCH MG: 200 TABLET ORAL at 09:03

## 2023-07-23 RX ADMIN — INSULIN ASPART SCH UNIT: 100 INJECTION, SOLUTION INTRAVENOUS; SUBCUTANEOUS at 12:23

## 2023-07-23 RX ADMIN — INSULIN ASPART SCH UNIT: 100 INJECTION, SOLUTION INTRAVENOUS; SUBCUTANEOUS at 17:15

## 2023-07-23 RX ADMIN — PANTOPRAZOLE SCH MG: 20 TABLET, DELAYED RELEASE ORAL at 09:03

## 2023-07-23 RX ADMIN — Medication SCH ML: at 21:01

## 2023-07-23 RX ADMIN — OXYCODONE HYDROCHLORIDE AND ACETAMINOPHEN PRN TAB: 5; 325 TABLET ORAL at 21:01

## 2023-07-23 RX ADMIN — POTASSIUM CHLORIDE SCH MEQ: 750 TABLET, FILM COATED, EXTENDED RELEASE ORAL at 09:03

## 2023-07-23 RX ADMIN — DOCUSATE SODIUM SCH MG: 100 CAPSULE ORAL at 19:42

## 2023-07-23 RX ADMIN — BUMETANIDE SCH MG: 1 TABLET ORAL at 09:02

## 2023-07-23 RX ADMIN — CARVEDILOL SCH MG: 25 TABLET, FILM COATED ORAL at 10:43

## 2023-07-23 RX ADMIN — ASPIRIN SCH MG: 81 TABLET ORAL at 09:03

## 2023-07-23 RX ADMIN — OXYCODONE HYDROCHLORIDE AND ACETAMINOPHEN PRN TAB: 5; 325 TABLET ORAL at 02:53

## 2023-07-23 RX ADMIN — METFORMIN HYDROCHLORIDE SCH MG: 500 TABLET, FILM COATED ORAL at 07:59

## 2023-07-23 RX ADMIN — DOCUSATE SODIUM SCH MG: 100 CAPSULE ORAL at 08:00

## 2023-07-23 RX ADMIN — POTASSIUM CHLORIDE SCH MEQ: 750 TABLET, FILM COATED, EXTENDED RELEASE ORAL at 21:01

## 2023-07-23 RX ADMIN — DOCUSATE SODIUM AND SENNOSIDES SCH EA: 8.6; 5 TABLET, FILM COATED ORAL at 08:00

## 2023-07-23 RX ADMIN — DILTIAZEM HYDROCHLORIDE SCH MG: 120 CAPSULE, COATED, EXTENDED RELEASE ORAL at 09:03

## 2023-07-23 RX ADMIN — BUMETANIDE SCH MG: 1 TABLET ORAL at 17:58

## 2023-07-23 RX ADMIN — WARFARIN SODIUM SCH MG: 4 TABLET ORAL at 17:58

## 2023-07-23 NOTE — PM&R PROGRESS NOTE
Subjective


HPI/CC On Admission


Date Seen by Provider:  Jul 23, 2023


Time Seen by Provider:  12:00


Subjective/Events-last exam


7/23/2023:


Doing well


Anxious about "further complications" and I tired to reassure


Patient has significant mental illness giving rise to anxiety and despondency 

and tearfulness





Review of Systems


General:  Fatigue, Malaise





Objective


Exam


Vital Signs





Vital Signs








  Date Time  Temp Pulse Resp B/P (MAP) Pulse Ox O2 Delivery O2 Flow Rate FiO2


 


7/23/23 09:00      Room Air  


 


7/23/23 08:00 36.4 93 18 110/56 (74) 96   





Capillary Refill :


General Appearance:  No Apparent Distress, WD/WN, Chronically ill, Obese


HEENT:  PERRL/EOMI, Normal ENT Inspection, Pharynx Normal


Neck:  Full Range of Motion, Normal Inspection, Non Tender, Supple, Carotid 

Bruit


Respiratory:  Chest Non Tender, Lungs Clear, Normal Breath Sounds, No Accessory 

Muscle Use, No Respiratory Distress


Cardiovascular:  Regular Rate, Rhythm, No Edema, No Gallop, No JVD, No Murmur, 

Normal Peripheral Pulses


Gastrointestinal:  Normal Bowel Sounds, No Organomegaly, No Pulsatile Mass, Non 

Tender, Soft


Back:  Normal Inspection, No CVA Tenderness, No Vertebral Tenderness


Extremity:  Normal Capillary Refill, Normal Inspection, Normal Range of Motion, 

Non Tender, No Calf Tenderness, No Pedal Edema


Neurologic/Psychiatric:  Alert, Oriented x3, CNs II-XII Norm as Tested, Abnormal

Gait, Depressed Affect, Motor Weakness (generalized)


Skin:  Normal Color, Warm/Dry


Lymphatic:  No Adenopathy





Results/Procedures


Lab


Laboratory Tests


7/23/23 05:40








Patient resulted labs reviewed.





FIM


Transfers


Therapy Code Descriptions/Definitions 





Functional [place] Measure:


0=Not Assessed/NA        4=Minimal Assistance


1=Total Assistance        5=Supervision or Setup


2=Maximal Assistance  6=Modified [place]


3=Moderate Assistance 7=Complete IndependenceSCALE: Activities may be completed 

with or without assistive devices.





6-Indepedent-patient completes the activity by him/herself with no assistance 

from a helper.


5-Set-up or Clean-up Assistance-helper sets up or cleans up; patient completes 

activity. [place] assists only prior to or  


    following the activity.


4-Supervision or Touching Assistance-helper provides verbal cues and/or 

touching/steadying and/or contact guard assistance as patient completes 

activity. Assistance may be provided   


    throughout the activity or intermittently.


3-Partial/Moderate Assistance-helper does LESS THAN HALF the effort. [place] 

lifts, holds or supports trunk or limbs, but provides less than half the effort.


2-Substantial/Maximal Assistance-helper does MORE THAN HALF the effort. [place] 

lifts or holds trunk or limbs and provides more than half the effort.


9-Qaqcibdbd-nyqlma does ALL the effort. Patient does none of the effort to 

complete the activity. Or, the assistance of 2 or more helpers is required for 

the patient to complete the  


    activity.


If activity was not attempted, code reason:


7-Patient Refused.


9-Not Applicable-not attempted and the patient did not perform the activity 

before the current illness, exacerbation or injury.


10-Not Attempted due to Environmental Limitations-(lack of equipment, weather 

restraints, etc.).


88-Not Attempted due to Medical Conditions or Safety Concerns.





Assessment/Plan


Assessment and Plan


Assess & Plan/Chief Complaint


CHF myopathy


Atrial fibrillation with RVR


S/P MVR


Chronic HFpEF


HTN


HLD


T2DM


COPD


OMEGA


Depression





Plan:


Meds transferred from ICU


Monitor INR


Monitor HR


Appreciate Dr Nolasco





7/23/2023:


Supportive care


Monitor closely





(1) Myopathy











AUGUST LOUIS DO                Jul 23, 2023 07:51

## 2023-07-24 VITALS — DIASTOLIC BLOOD PRESSURE: 57 MMHG | SYSTOLIC BLOOD PRESSURE: 112 MMHG

## 2023-07-24 VITALS — DIASTOLIC BLOOD PRESSURE: 55 MMHG | SYSTOLIC BLOOD PRESSURE: 113 MMHG

## 2023-07-24 RX ADMIN — BUMETANIDE SCH MG: 1 TABLET ORAL at 20:00

## 2023-07-24 RX ADMIN — Medication SCH ML: at 14:00

## 2023-07-24 RX ADMIN — FLUTICASONE FUROATE AND VILANTEROL TRIFENATATE SCH EACH: 100; 25 POWDER RESPIRATORY (INHALATION) at 06:43

## 2023-07-24 RX ADMIN — PREDNISOLONE SCH ML: 15 SOLUTION ORAL at 16:49

## 2023-07-24 RX ADMIN — DOCUSATE SODIUM AND SENNOSIDES SCH EA: 8.6; 5 TABLET, FILM COATED ORAL at 21:37

## 2023-07-24 RX ADMIN — INSULIN ASPART SCH UNIT: 100 INJECTION, SOLUTION INTRAVENOUS; SUBCUTANEOUS at 13:54

## 2023-07-24 RX ADMIN — CARVEDILOL SCH EA: 25 TABLET, FILM COATED ORAL at 07:37

## 2023-07-24 RX ADMIN — INSULIN ASPART SCH UNIT: 100 INJECTION, SOLUTION INTRAVENOUS; SUBCUTANEOUS at 05:56

## 2023-07-24 RX ADMIN — ASPIRIN SCH MG: 81 TABLET ORAL at 07:34

## 2023-07-24 RX ADMIN — DOCUSATE SODIUM SCH MG: 100 CAPSULE ORAL at 21:37

## 2023-07-24 RX ADMIN — PREDNISOLONE SCH ML: 15 SOLUTION ORAL at 17:16

## 2023-07-24 RX ADMIN — DOCUSATE SODIUM AND SENNOSIDES SCH EA: 8.6; 5 TABLET, FILM COATED ORAL at 07:35

## 2023-07-24 RX ADMIN — POTASSIUM CHLORIDE SCH MEQ: 750 TABLET, FILM COATED, EXTENDED RELEASE ORAL at 07:34

## 2023-07-24 RX ADMIN — OXYCODONE HYDROCHLORIDE AND ACETAMINOPHEN PRN TAB: 5; 325 TABLET ORAL at 08:40

## 2023-07-24 RX ADMIN — INSULIN ASPART SCH UNIT: 100 INJECTION, SOLUTION INTRAVENOUS; SUBCUTANEOUS at 21:30

## 2023-07-24 RX ADMIN — POLYETHYLENE GLYCOL (3350) SCH GM: 17 POWDER, FOR SOLUTION ORAL at 07:35

## 2023-07-24 RX ADMIN — AMIODARONE HYDROCHLORIDE SCH MG: 200 TABLET ORAL at 07:34

## 2023-07-24 RX ADMIN — CARVEDILOL SCH MG: 25 TABLET, FILM COATED ORAL at 07:37

## 2023-07-24 RX ADMIN — BUMETANIDE SCH MG: 1 TABLET ORAL at 18:17

## 2023-07-24 RX ADMIN — POTASSIUM CHLORIDE SCH MEQ: 750 TABLET, FILM COATED, EXTENDED RELEASE ORAL at 21:37

## 2023-07-24 RX ADMIN — DILTIAZEM HYDROCHLORIDE SCH MG: 120 CAPSULE, COATED, EXTENDED RELEASE ORAL at 07:34

## 2023-07-24 RX ADMIN — WARFARIN SODIUM SCH MG: 4 TABLET ORAL at 18:16

## 2023-07-24 RX ADMIN — Medication SCH ML: at 21:38

## 2023-07-24 RX ADMIN — INSULIN ASPART SCH UNIT: 100 INJECTION, SOLUTION INTRAVENOUS; SUBCUTANEOUS at 17:16

## 2023-07-24 RX ADMIN — WARFARIN SODIUM SCH MG: 2.5 TABLET ORAL at 18:16

## 2023-07-24 RX ADMIN — PREDNISOLONE SCH ML: 15 SOLUTION ORAL at 21:47

## 2023-07-24 RX ADMIN — BUMETANIDE SCH MG: 1 TABLET ORAL at 07:34

## 2023-07-24 RX ADMIN — Medication SCH ML: at 05:56

## 2023-07-24 RX ADMIN — PANTOPRAZOLE SCH MG: 20 TABLET, DELAYED RELEASE ORAL at 07:37

## 2023-07-24 RX ADMIN — DOCUSATE SODIUM SCH MG: 100 CAPSULE ORAL at 07:35

## 2023-07-24 RX ADMIN — POLYETHYLENE GLYCOL (3350) SCH GM: 17 POWDER, FOR SOLUTION ORAL at 21:30

## 2023-07-24 NOTE — OCCUPATIONAL THERAPY EVAL
OT Evaluation-General/PLF


Medical Diagnosis


Admission Date


2023 at 13:35


Medical Diagnosis:  CHF Myopathy


Onset Date:  2023





Therapy Diagnosis


Therapy Diagnosis:  proximal weakness





Precautions


Precautions/Isolations:  Fall Prevention, Standard Precautions, Pressure Ulcer





Weight Bear Status


Sternal precautions





Referral


Physician:  Manohar Villalta Reason:  Self Care, Evaluation/Treatment





Medical History


Pertinent Medical History:  Atrial Fib, COPD, GERD, HTN, MI


Current History


S/P MVR on 2023; Admitted to ARU on 2023 for CHF myopathy and proximal

weakness; Admitted to ICU on 2023 for A-Fib with RVR; Readmitted to ARU on 

2023





Social History


Home:  Single Level


Current Living Status:  Alone


Entry Into Home:  Stairs With Railing


 Steps Into Home:  3





ADL-Prior Level of Function


SCALE: Activities may be completed with or without assistive devices.





6-Indepedent-patient completes the activity by him/herself with no assistance 

from a helper.


5-Set-up or Clean-up Assistance-helper sets up or cleans up; patient completes 

activity. [place] assists only prior to or  


    following the activity.


4-Supervision or Touching Assistance-helper provides verbal cues and/or 

touching/steadying and/or contact guard assistance as patient completes 

activity. Assistance may be provided   


    throughout the activity or intermittently.


3-Partial/Moderate Assistance-helper does LESS THAN HALF the effort. [place] 

lifts, holds or supports trunk or limbs, but provides less than half the effort.


2-Substantial/Maximal Assistance-helper does MORE THAN HALF the effort. [place] 

lifts or holds trunk or limbs and provides more than half the effort.


2-Hthpwstzb-pwznzo does ALL the effort. Patient does none of the effort to 

complete the activity. Or, the assistance of 2 or more helpers is required for 

the patient to complete the  


    activity.


If activity was not attempted, code reason:


7-Patient Refused.


9-Not Applicable-not attempted and the patient did not perform the activity 

before the current illness, exacerbation or injury.


10-Not Attempted due to Environmental Limitations-(lack of equipment, weather 

restraints, etc.).


88-Not Attempted due to Medical Conditions or Safety Concerns.


DME/Equipment Comments


Self Care: PLOF  Independent


Functional Cognition: PLOF Independent


DME/Equipment:  Bedside Commode, Shower Hose Extender, Tub/Shower


DME/Equipment Comments


rolling walker, CPAP at night 1-2 liters NC


Occupation:  chart  for [employer]


Drive Self:  Yes (no driving now 6-8 weeks)





OT Current Status


Subjective


Feeling depressed from this set back





Pain





   Numeric Pain Scale:  7 (resolves w/ rest)


   Location Body Site:  Chest





Mental Status/Objective


Patient Orientation:  Person, Place, Time, Situation


Attachments:  Oxygen (at night), Telemetry





Current


Hand Dominance:  Right


Upper Extremity ROM


ROM sternal precautions WFL


Upper Extremity Coordination


INTACT


Upper Extremity Sensation


INTACT


Upper Extremity Strength


Lifting precautions 8 pounds





ADL-Treatment


Eating (QC):  6


Oral Hygiene (QC):  5


Shower/Bathe Self (QC):  7 (declined at this time)


Upper Body Dressing (QC):  4


Lower Body Dressing (QC):  4


On/Off Footwear (QC):  4 (use of long handle shoe horn)


Toileting Hygiene (QC):  4





Education


OT Patient Education:  Correct positioning, Exercise program, Modified ADL 

techniques, Progress toward Goal/Update tx plan, Purpose of tx/functional 

activities, Reviewed precautions, Rehab process, Safety issues, Transfer 

techniques, Use of adapted equipment


Teaching Recipient:  Patient


Teaching Methods:  Demonstration, Discussion


Response to Teaching:  Verbalize Understanding, Reinforcement Needed





BIMS CAM


BIMS


Expression of Ideas and Wants:  Without Difficulty


Understanding Verbal Content:  Understands


Brief Interview/Mental Status:  Yes


IRF RICKIE BIMS:  








IRF RICKIE BIMS Response (Comments) Value


 


Repitition of Three Words Three 3


 


Recalls Socks Yes, No Cue Required 2


 


Recalls Blue Yes, No Cue Required 2


 


Recalls Bed Yes, No Cue Required 2


 


Year Correct 3


 


Month Accurate Within 5 Days 2


 


Day Correct 1


 


Total  15








Patient Normally Able to Recal:  Location of own room, Staff Names and faces, 

That he/she in a [facility]


Should Staff Asses. Mental St.:  No





CAM


Mental Status Change/Baseline:  0


Inattention:  0


Disorganized thinkin


Altered level of consciousness:  0





OT Short Term Goals


Short Term Goals


Time Frame:  2023


Toileting hygiene:  5


Shower/bathe self:  5


Upper body dressin


Lower body dressin





OT Long Term Goals


Long Term Goals


Oral Hygiene (QC):  6


Toileting Hygiene (QC):  6


Shower/Bathe Self (QC):  6


Upper Body Dressing (QC):  6


Lower Body Dressing (QC):  6


On/Off Footwear (QC):  6


1=Demonstrate adherence to instructed precautions during ADL tasks.


2=Patient will verbalize/demonstrate understanding of assistive 

devices/modifications for ADL.


3=Patient will improve strength/tolerance for activity to enable patient to 

perform ADL's.





OT Education/Plan


Problem List/Assessment


Assessment:  Decreased Activ Tolerance, Decreased UE Strength, Impaired Self-

Care Skills





Discharge Recommendations


Plan/Recommendations:  Continue POC





Treatment Plan/Plan of Care


Patient would benefit from OT for education, treatment and training to promote 

independence in ADL's, mobility, safety and/or upper extremity function for 

ADL's.


Plan of Care:  ADL Retraining, Concurrent Therapy, Functional Mobility, Group 

Exercise/Act as Ind, UE Funct Exercise/Act


Treatment Duration:  2023


Frequency:  At least 5 of 7 days/Wk (IRF)


Estimated Hrs Per Day:  1.5 hours per day


Agreement:  Yes


Rehab Potential:  Good


Resting in recliner ready for PT session





Time


Start Time:  08:45


Stop Time:  08:55


DATE:  2023


Total Time Billed (hr/min):  10


Billed Treatment Time


EVM 10 min











DRINNEN,MICHELLE OT            2023 09:17

## 2023-07-24 NOTE — PM&R PROGRESS NOTE
Subjective


HPI/CC On Admission


Date Seen by Provider:  Jul 24, 2023


Time Seen by Provider:  10:30


Subjective/Events-last exam


7/24/2023:


Doing well


Monitor closely


Lungs are clear


No pain


Reviewed meds








7/23/2023:


Doing well


Anxious about "further complications" and I tired to reassure


Patient has significant mental illness giving rise to anxiety and despondency 

and tearfulness





Review of Systems


General:  Fatigue, Malaise





Objective


Exam


Vital Signs





Vital Signs








  Date Time  Temp Pulse Resp B/P (MAP) Pulse Ox O2 Delivery O2 Flow Rate FiO2


 


7/24/23 19:00  94      


 


7/24/23 09:10      Room Air  


 


7/24/23 08:02 36.0  18 113/55 (74) 97   


 


7/23/23 08:28       2.00 





Capillary Refill :


General Appearance:  No Apparent Distress, WD/WN, Chronically ill, Obese


HEENT:  PERRL/EOMI, Normal ENT Inspection, Pharynx Normal


Neck:  Full Range of Motion, Normal Inspection, Non Tender, Supple, Carotid 

Bruit


Respiratory:  Chest Non Tender, Lungs Clear, Normal Breath Sounds, No Accessory 

Muscle Use, No Respiratory Distress


Cardiovascular:  Regular Rate, Rhythm, No Edema, No Gallop, No JVD, No Murmur, 

Normal Peripheral Pulses


Gastrointestinal:  Normal Bowel Sounds, No Organomegaly, No Pulsatile Mass, Non 

Tender, Soft


Back:  Normal Inspection, No CVA Tenderness, No Vertebral Tenderness


Extremity:  Normal Capillary Refill, Normal Inspection, Normal Range of Motion, 

Non Tender, No Calf Tenderness, No Pedal Edema


Neurologic/Psychiatric:  Alert, Oriented x3, CNs II-XII Norm as Tested, Abnormal

Gait, Depressed Affect, Motor Weakness (generalized)


Skin:  Normal Color, Warm/Dry


Lymphatic:  No Adenopathy





Results/Procedures


Lab


Patient resulted labs reviewed.





FIM


Transfers


Therapy Code Descriptions/Definitions 





Functional [place] Measure:


0=Not Assessed/NA        4=Minimal Assistance


1=Total Assistance        5=Supervision or Setup


2=Maximal Assistance  6=Modified [place]


3=Moderate Assistance 7=Complete IndependenceSCALE: Activities may be completed 

with or without assistive devices.





6-Indepedent-patient completes the activity by him/herself with no assistance 

from a helper.


5-Set-up or Clean-up Assistance-helper sets up or cleans up; patient completes 

activity. [place] assists only prior to or  


    following the activity.


4-Supervision or Touching Assistance-helper provides verbal cues and/or 

touching/steadying and/or contact guard assistance as patient completes a

ctivity. Assistance may be provided   


    throughout the activity or intermittently.


3-Partial/Moderate Assistance-helper does LESS THAN HALF the effort. [place] 

lifts, holds or supports trunk or limbs, but provides less than half the effort.


2-Substantial/Maximal Assistance-helper does MORE THAN HALF the effort. [place] 

lifts or holds trunk or limbs and provides more than half the effort.


4-Xdvuvlqzp-nxnjav does ALL the effort. Patient does none of the effort to 

complete the activity. Or, the assistance of 2 or more helpers is required for 

the patient to complete the  


    activity.


If activity was not attempted, code reason:


7-Patient Refused.


9-Not Applicable-not attempted and the patient did not perform the activity 

before the current illness, exacerbation or injury.


10-Not Attempted due to Environmental Limitations-(lack of equipment, weather 

restraints, etc.).


88-Not Attempted due to Medical Conditions or Safety Concerns.





ADL-Treatment


Eating (QC):  6


Oral Hygiene (QC):  5


Shower/Bathe Self (QC):  7 (declined at this time)


Upper Body Dressing (QC):  4


Lower Body Dressing (QC):  4


On/Off Footwear (QC):  4 (use of long handle shoe horn)


Toileting Hygiene (QC):  4





Assessment/Plan


Assessment and Plan


Assess & Plan/Chief Complaint


CHF myopathy


Atrial fibrillation with RVR


S/P MVR


Chronic HFpEF


HTN


HLD


T2DM


COPD


OMEGA


Depression





Plan:


Meds transferred from ICU


Monitor INR


Monitor HR


Appreciate Dr Nolasco





7/23/2023:


Supportive care


Monitor closely





7/24/2023:


Monitor INR


Supportive care





(1) Myopathy











AUGUST LOUIS DO                Jul 24, 2023 09:56

## 2023-07-24 NOTE — INDIVIDUALIZED PLAN OF CARE
Individualized Plan of Care


Rehab Nursing IPOC Order


Admission Date


Jul 22, 2023 at 13:35


Current Orders





Orders


Admission Order(Inpt,Obs,Sdc) (7/22/23 08:58)


Vital Signs: Per Unit Policy ( 08,16,00 (7/22/23 08:58)


Pardeep Hose 09,21 (7/22/23 08:58)


Sequential Compression Device [id number] (7/22/23 08:58)


-Inpt Rehab Con (7/22/23 08:58)


Rehab Nursing Orders-Ipoc (7/22/23 08:58)


Physical Therapy Rehab Orders (7/22/23 08:58)


Occupational Therapy Rehab Ord (7/22/23 08:58)


Speech Therapy Rehab Orders (7/22/23 08:58)


Cbc With Automated Diff (7/23/23 06:00)


Comprehensive Metabolic Panel (7/23/23 06:00)


Precautions (Aru) (7/22/23 08:58)


Weekly Weight WEEK (7/22/23 08:58)


Rehab-Intensity Of Therapy (7/22/23 08:58)


Initiate Admission Nursing Pro .admission (7/22/23 08:58)


Alprazolam Tablet (Xanax Tablet) (7/22/23 09:00)


Calcium Carbonate Chew Tablet (Antacid C (7/22/23 09:00)


Diphenhydramine Tablet (Benadryl Tablet) (7/22/23 09:00)


Docusate Sodium Capsule (Colace Capsule) (7/22/23 09:00)


Docusate Sodium Capsule (Colace Capsule) (7/22/23 09:00)


Bisacodyl Suppository (Dulcolax Supposit (7/22/23 09:00)


Lactulose Oral Solution (Enulose Oral So (7/22/23 09:00)


Na Phos/Na Biphos Enema (Fleet Enema Akash (7/22/23 09:00)


Guaifenesin/Codeine Syrup (Robitussin Ac (7/22/23 09:00)


Loperamide Tablet (Imodium Tablet) (7/22/23 09:00)


Melatonin  Tablet (Melatonin  Tablet) (7/22/23 09:00)


Polyethylene Glycol Powder Pkt (Miralax (7/22/23 09:00)


Ondansetron  Oral Dissolve Tab (Zofran (7/22/23 09:00)


Senna S Tablet (Senokot S Tablet) (7/22/23 09:00)


Acetaminophen Tablet/Caplet (Tylenol  T (7/22/23 09:00)


Protime With Inr (7/23/23 06:00)


Initiate Admission Nursing Pro .admission (7/22/23 08:58)


Admission Arrival Bed Request (7/22/23 13:35)


Acetaminophen  Tablet (Tylenol  Tablet) (7/22/23 14:45)


Albuterol  Pre-Mix Nebs (Rt) (Proventil (7/22/23 14:45)


Amiodarone Tablet (Cordarone Tablet) (7/23/23 09:00)


Aspirin Enteric Coated Tablet (Ecotrin T (7/23/23 09:00)


Atorvastatin Tablet (Lipitor Tablet) (7/22/23 21:00)


Diltiazem Cd 24 Hr Capsule (Cardizem Cd (7/23/23 09:00)


Oxycodone/Apap 5/325mg Tablet (Percocet (7/22/23 14:45)


Potassium Chloride (Tablet) (Klor Con Ta (7/22/23 21:00)


Ascorbic Acid Tablet (Vitamin C Tablet) (7/23/23 09:00)


Bumetanide Tablet (Bumex Tablet) (7/22/23 21:00)


(Nf) Dulaglutide (Trulicity) (7/22/23 14:45)


Pantoprazole Tablet (Protonix Tablet) (7/23/23 09:00)


(Nf) Vilazodone Hydrochloride (Viibryd) (7/23/23 09:05)


Svn Small Volume Nebulizer (7/22/23 14:41)


Empagliflozin Tablet (Jardiance Tablet) (7/23/23 08:00)


Metformin Tablet (Glucophage Tablet) (7/22/23 18:00)


Warfarin Tablet (Coumadin Tablet) (7/22/23 18:00)


Warfarin Tablet (Coumadin Tablet) (7/22/23 18:00)


Fluticasone/Vilanterol 100 Mcg (Breo Ell (7/23/23 08:00)


Telemetry (7/22/23 16:07)


Telemetry Nursing Assessment ( (7/22/23 16:07)


Consult Cardiology (7/22/23 16:07)


General/Regular (7/22/23 Dinner)


Code/Resuscitation (7/22/23 19:30)


Manual Differential (7/23/23 05:40)


Accucheck Achs ACHS (7/23/23 07:39)


Insulin Aspart (Novolog) (Novolog (Charg (7/23/23 11:00)


Patient May Use Own Meds, All (Patient M (7/23/23 09:00)


Non-Formulary Medication (Non-Formulary (7/23/23 09:21)


Sodium Chloride Flush (Catheter Flush Sy (7/23/23 22:00)


Bumetanide Tablet (Bumex Tablet) (7/23/23 17:00)


Magic Mouthwash, Adult (Magic Mouthwash, (7/24/23 13:00)


Tramadol Tablet (Ultram Tablet) (7/24/23 10:45)


Oxycodone Immediate Rel Tablet (Oxyir Ta (7/24/23 10:45)


Nystatin Susp For Compounding (Mycostati (7/24/23 13:00)


Glucerna (7/24/23 11:16)


Patient Visit (7/24/23 )


Pt Eval Moderate Complexity (7/24/23 )


Patient Visit (7/24/23 )


Functional Activities, Ea 15 (7/24/23 )


Gait Training, Ea 15 Min (7/24/23 )


Exercise Therap, Ea 15 Min (7/24/23 )


Patient Visit (7/24/23 )


Speech Sound Lang Comp (7/24/23 )


Dysphagia Evaluation Std (7/24/23 )


Consult Wound Care Physician (7/24/23 14:38)


Dressing Order (Intervention) DAILY (7/24/23 16:39)


Bumetanide Tablet (Bumex Tablet) (7/24/23 19:30)





Rehab Nursing Orders:  Ongoing Assess. of Function Status, Bladder Management, 

Bladder Scan, Bladder Training, Bowel Management, Bowel Training, Disease 

Management & Educaiton, DVT Prophylaxis, Fall Prevention, 

Fluid/Electrolyte/Nutrition Mgmt, Infection Prevention, Medication Management & 

Education, Management of Risks & Complications, Management of Skin Intergrity, 

Nutrition Management, Pain Management, Patient/Family Support, Safety 

Management, Wound Management





Intensity of Therapy to be met


Patient to be seen:  Min.3h per day/5 of 7d





PT IPOC


Problem List:  Activity Tolerance, Functional Strength, Safety, Balance, Gait, 

Transfer, Bed Mobility, ROM


Treatment Plan:  Continue Plan of Care


Bed Mobility, Concurrent Therapy, Education, Functional Activity [place], 

Functional Strength, Group Therapy, Gait, Safety, Therapeutic Exercise, Transf

ers


Treatment Duration:  Jul 29, 2023


Frequency:  At least 5 of 7 days/Wk (IRF)


Estimated Hrs Per Day:  1.5 hours per day





OT IPOC


Problems:  Decreased Activ Tolerance, Decreased UE Strength, Impaired Self-Care 

Skills


OT Treatment, Training and Edu:  Yes


Plan of Care:  ADL Retraining, Concurrent Therapy, Functional Mobility, Group 

Exercise/Act as Ind, UE Funct Exercise/Act


Treatment Duration:  Jul 31, 2023


Frequency:  At least 5 of 7 days/Wk (IRF)


Estimated Hrs Per Day:  1.5 hours per day





ST IPOC


Speech Therapy Treatment Plan:  Discontinue ST


Treatment Duration:  Jul 24, 2023


Frequency:  Modified Program (IRF)


Estimated Hrs Per Day:  Other





/Case Mgmt


/Case Managemen:  Discharge Planning





Dietitian/Nutritionist


Dietitian/Nutritionist to monitor nutritional status and make changes and/or 

recommendations as needed and work with speech pathology on dietary upgrades as 

the occur.





Physician IPOC


Medical Issues being managed closely and that require the 24 hour availability 

of a physician:





Recent cardiac dysfunction requiring AF RVR management and additional cardiac 

meds along with cardiology consultation will require close monitoring for 

decompensation


Medical Issues:  Bowel/Bladder Function, DVT Prophylaxis, Falls Precautions, 

Fluid/Electrolyte/Nutrition Balance, Infection Protection, Pain Management, 

Wound Care


Brief Synthesis of Preadmission Screen, Post-Admission Evaluation, and Therapy 

Evaluations:





PT OT will focus on regaining function with the use of AD in order to regain 

function and increase stamina and increase independence in ADL's


Medical Prognosis:  Good


Anticipated Length of Stay:  7 days











AUGUST LOUIS DO                Jul 24, 2023 09:56

## 2023-07-24 NOTE — ST COGNITIVE LINGUISTIC EVAL
Speech Evaluation-General


Medical Diagnosis


CHF Myopathy


Onset Date:  Jul 11, 2023





Therapy Diagnosis


Therapy Diagnosis:  WNL COGNITION





Precautions


Precautions/Isolations:  Fall Prevention, Standard Precautions





Referral


Referring Physician:  Dr. Villela


Reason for Referral:  Evaluation/Treatment





Medical History


Pertinent Medical History:  Atrial Fib, COPD, GERD, HTN, MI


anemia, cardiomyopathy, CHF, COPD, DM II, edema, GERD, hypokalemia, 

hyponatremia, leukocytosis, obesity, OMEGA, A-Fib, HTN, respiratory failure, 

tobacco abuse, SONALI, depression, tachycardia


Current History


S/P MVR on 7/11/2023; Admitted to ARU on 7/19/2023 for CHF myopathy and proximal

weakness; Admitted to ICU on 7/20/2023 for A-Fib with RVR; Readmitted to ARU on 

7/22/2023


Reviewed History:  Yes





Social History


Current Living Status:  Alone





Speech PLF-Current Status


Prior Level of Function





Pt lives at home alone. States her  passed away in 2018. Pt is a


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 She manages her own medications and uses a pill box. Pt


also manages her own finances using a combination of auto bill pay and


writing checks. She reports she keeps a record of what bills are due when


and checks them off as she pays them.





Subjective


Pt sitting up in chair when SLP enters the room. Pt states her cognition is 

improving since being at [facility]. Pt is pleasant and cooperative throughout 

evaluation.





Pain





   Numeric Pain Scale:  5-Moderate Pain


   Pain Description:  Ache, Dull, Throbbing


   Comment:  sternum





Language Eval: Auditory


Comprehends Simple Yes/No Ques:  Functional


Follows 1-Step Commands:  Functional


Follows General Conversations:  Functional





Language Eval: Verbal Language


Completes Spontaneous Greeting:  Functional


Imitates Simple Words/Phrases:  Functional


Word Finding:  Functional


Requests Basic Needs:  Functional


Expresses Complex Ideas:  Functional





Objective Cognitive Domain


Attention:  WNL


Memory:  Mild


Problem Solving:  Functional


Executive Functions:  WNL


Visuospatial Skills:  WNL


Composite Severity Rating:  WNL


Clock Drawing Severity Rating:  WNL


Score:  26





Objective


Formal/Standardized Tests


SLUMS


Results


26/30


Oral Motor/Speech Production


Appears WNL


Impression


Pt's cognition appears WNL at this time. Pt did well on the SLUMS. She had 

slight difficulty recalling 2/5 words following a short delay, but was able to 

recall those 2 words with verbal prompts. Pt also missed one question following 

a short story. Pt does well with orientation questions, basic problem solving, 

executive functioning, generative naming, following basic directions, and mental

manipulation. Pt to be seen for one more session to address medication 

management and writing a check.





Speech Short Term Goals


Short Term Goals


Short Term Goals


1. Pt to complete medication and money management tasks with 80% accuracy 

independently.





Speech Long Term Goals


Long Term Goals


Pt to complete medication and money management with 100% accuracy independently.





Speech-Plan


Patient/Family Goals


Patient/Family Goals:  


Pt's goal is to return home at [facility].





Treatment Plan


Speech Therapy Treatment Plan:  Continue Plan of Care


Frequency:  1 time per week


Estimated Hrs Per Day:  .5 hour per day


Rehab Potential:  Good


Pt/Family Agrees to Plan:  Yes





Safety Risks/Education


Teaching Recipient:  Patient


Teaching Methods:  Discussion


Response to Teaching:  Verbalize Understanding


Education Topics Provided:  


Pt educated on the role of the SLP, purpose of evaluation, results, and


recommendations. Pt receptive and verbalized understanding.





Time


Speech Therapy Time In:  09:55


Speech Therapy Time Out:  10:10


DATE:  Jul 24, 2023


Total Billed Time:  15


Billed Treatment Time


S/L Michelle Mccabe Speech Therapy Jul 24, 2023 10:36

## 2023-07-24 NOTE — PHYSICAL THERAPY DAILY NOTE
PT Daily Note-Current


Subjective


PT was in room sitting in recliner upon arrival and good for therapy. PT states 

5/10 pain in sternum area. with Pain increasing after having to get in and out 

of bed for evaluation. Nursing was notified and pain med was given. pt was 

tearful with increasing pain but was able to compose herself and resume therapy.

Pt was left in recliner with call light and all needs met after therapy.





Pain


Section J - Health Conditions


1. Rarely or not at all


2. Occasionally


3. Frequently


4. Almost constantly


8. Unable to answer


Pain Effect on Sleep:  3


Pain Interference with Therapy:  3


Pain Interference w/Day-to-Day:  3





Transfers


SCALE: Activities may be completed with or without assistive devices.





6-Indepedent-patient completes the activity by him/herself with no assistance 

from a helper.


5-Set-up or Clean-up Assistance-helper sets up or cleans up; patient completes 

activity. [place] assists only prior to or  


    following the activity.


4-Supervision or Touching Assistance-helper provides verbal cues and/or 

touching/steadying and/or contact guard assistance as patient completes 

activity. Assistance may be provided   


    throughout the activity or intermittently.


3-Partial/Moderate Assistance-helper does LESS THAN HALF the effort. [place] 

lifts, holds or supports trunk or limbs, but provides less than half the effort.


2-Substantial/Maximal Assistance-helper does MORE THAN HALF the effort. [place] 

lifts or holds trunk or limbs and provides more than half the effort.


7-Epyayrtyu-agdvlh does ALL the effort. Patient does none of the effort to 

complete the activity. Or, the assistance of 2 or more helpers is required for 

the patient to complete the  


    activity.


If activity was not attempted, code reason:


7-Patient Refused.


9-Not Applicable-not attempted and the patient did not perform the activity 

before the current illness, exacerbation or injury.


10-Not Attempted due to Environmental Limitations-(lack of equipment, weather 

restraints, etc.).


88-Not Attempted due to Medical Conditions or Safety Concerns.





Weight Bearing


Right Lower Extremity:  Right


Full Weight Bearing


Left Lower Extremity:  Left


Full Weight Bearing





Stair Training


4 Steps (QC):  4





Exercises


Seated Therapy Exercises:  Sit to stand, Long arc quads, Hip flexion, Kicking 

activity, Hamstring Curls, Hip abd/add, Glut set





Treatments


PT was able to preform car transfer, 8 stairs and ambulation of 150ft with CGA 

and 4WW this day. pt required extensive rest breaks after each activity 

secondary to SOB with O2 WFL at all times. Pt was able to ambulate distance with

no sitting rest breaks but is unable to talk during ambulation and focuses on 

breathing.





Assessment


Current Status:  Good Progress





PT Long Term Goals


Long Term Goals


PT Long Term Goals Time Frame:  Jul 29, 2023


Roll Left & Right (QC):  6


Sit to Lying (QC):  6


Lying-Sitting on Side/Bed(QC):  6


Sit to Stand (QC):  6


Chair/Bed-to-Chair Xfer(QC):  6


Toilet Transfer (QC):  6


Car Transfer (QC):  6


Does the Patient Walk:  Yes


Walk 10 feet (QC):  6


Walk 50ft with 2 Turns (QC):  6


Walk 150 ft (QC):  6


Walking 10ft on Uneven Surface:  6


1 Step (curb) (QC):  6


4 Steps (QC):  6


12 Steps (QC):  9


Picking up an Object (QC):  6


Does the Pt use WC or Scooter?:  No


Wheel 50 feet with 2 turns (QC:  9


Type:  N/A


Wheel 150 feet:  9


Type:  N/A





PT Plan


Problem List


Problem List:  Activity Tolerance





Treatment/Plan


Treatment Plan:  Continue Plan of Care


Treatment Plan:  Bed Mobility, Concurrent Therapy, Education, Functional 

Activity Lazaro, Functional Strength, Group Therapy, Gait, Safety, Therapeutic 

Exercise, Transfers


Treatment Duration:  Jul 29, 2023


Frequency:  At least 5 of 7 days/Wk (IRF)


Estimated Hrs Per Day:  1.5 hours per day


Patient and/or Family Agrees t:  Yes





Time


Time In:  0855


Time Out:  0955


DATE:  Jul 24, 2023


Total Billed Treatment Time:  60


Total Billed Treatment


1


FA x 2


GT 


EX











Karin Monge PTA              Jul 24, 2023 10:06

## 2023-07-24 NOTE — OCCUPATIONAL THER DAILY NOTE
OT Current Status-Daily Note


Subjective


Pt lying in bed upon arrival, agreed to therapy, alert and cooperative.





ADL-Treatment


Pt ambulated with 4WW, CGA to bathroom, sat on shower bench and completed shower

with SBA required, Pt sat 100% of the time, was able to cleanse body except 

buttocks area, pt unable to reach on this date, due to fatigue. Pt then dried 

self off, ambulated with 4WW and completed upper and lower body dressing, with 

set up assist. Pt required multiple recovery breaks, before and after shower due

to fatigue and needing to catch breath. Pt was educated on the importance of 

working on getting stronger while on ARU, pt verbalized understanding. OT will 

continue to work on strengthening and building up endurance for daily functional

tasks required for ADLs.


Therapy Code Descriptions/Definitions 





Functional [place] Measure:


0=Not Assessed/NA        4=Minimal Assistance


1=Total Assistance        5=Supervision or Setup


2=Maximal Assistance  6=Modified [place]


3=Moderate Assistance 7=Complete IndependenceSCALE: Activities may be completed 

with or without assistive devices.





6-Indepedent-patient completes the activity by him/herself with no assistance 

from a helper.


5-Set-up or Clean-up Assistance-helper sets up or cleans up; patient completes 

activity. [place] assists only prior to or  


    following the activity.


4-Supervision or Touching Assistance-helper provides verbal cues and/or 

touching/steadying and/or contact guard assistance as patient completes 

activity. Assistance may be provided   


    throughout the activity or intermittently.


3-Partial/Moderate Assistance-helper does LESS THAN HALF the effort. [place] 

lifts, holds or supports trunk or limbs, but provides less than half the effort.


2-Substantial/Maximal Assistance-helper does MORE THAN HALF the effort. [place] 

lifts or holds trunk or limbs and provides more than half the effort.


0-Ghsrwduyd-gseyyi does ALL the effort. Patient does none of the effort to 

complete the activity. Or, the assistance of 2 or more helpers is required for 

the patient to complete the  


    activity.


If activity was not attempted, code reason:


7-Patient Refused.


9-Not Applicable-not attempted and the patient did not perform the activity 

before the current illness, exacerbation or injury.


10-Not Attempted due to Environmental Limitations-(lack of equipment, weather 

restraints, etc.).


88-Not Attempted due to Medical Conditions or Safety Concerns.





Education


OT Patient Education:  Energy conservation, Progress toward Goal/Update tx plan,

Purpose of tx/functional activities, Reviewed precautions, Rehab process


Teaching Recipient:  Patient


Teaching Methods:  Discussion


Response to Teaching:  Verbalize Understanding





OT Short Term Goals


Short Term Goals


Time Frame:  2023


Toileting hygiene:  5


Shower/bathe self:  5


Upper body dressin


Lower body dressin





OT Long Term Goals


Long Term Goals


Acute change in mental status:  0


Inattention:  0


Disorganized thinkin


Altered level of consciousness:  0


Oral Hygiene (QC):  6


Toileting Hygiene (QC):  6


Shower/Bathe Self (QC):  6


Upper Body Dressing (QC):  6


Lower Body Dressing (QC):  6


On/Off Footwear (QC):  6


1=Demonstrate adherence to instructed precautions during ADL tasks.


2=Patient will verbalize/demonstrate understanding of assistive 

devices/modifications for ADL.


3=Patient will improve strength/tolerance for activity to enable patient to 

perform ADL's.





OT Education/Plan


Problem List/Assessment


Assessment:  Decreased Activ Tolerance, Decreased UE Strength





Discharge Recommendations


Plan/Recommendations:  Continue POC





Treatment Plan/Plan of Care


Patient would benefit from OT for education, treatment and training to promote 

independence in ADL's, mobility, safety and/or upper extremity function for 

ADL's.


Plan of Care:  ADL Retraining, Concurrent Therapy, Functional Mobility, Group 

Exercise/Act as Ind, UE Funct Exercise/Act


Treatment Duration:  2023


Frequency:  At least 5 of 7 days/Wk (IRF)


Estimated Hrs Per Day:  1.5 hours per day


Agreement:  Yes


Rehab Potential:  Good





Time


Start Time:  13:30


Stop Time:  14:30


DATE:  2023


Total Time Billed (hr/min):  60


Billed Treatment Time


1 visit ADL 4 (60)











Tonya Castrejon COTA          2023 14:50

## 2023-07-24 NOTE — CARDIOLOGY PROGRESS NOTE
Subjective


Date Seen by Provider:  Jul 24, 2023


Time Seen by Provider:  08:15


Subjective/Events-last exam


Patient is up walking in room with PT. Denies any chest pain or palpitations. 

Reports some dyspnea on exertion.





Objective-Cardiology


Exam


Last Set of Vital Signs





Vital Signs








 7/23/23 7/24/23 7/24/23 7/24/23





 08:28 08:02 09:10 13:00


 


Temp  36.0  


 


Pulse    99


 


Resp  18  


 


B/P (MAP)  113/55 (74)  


 


Pulse Ox  97  


 


O2 Delivery   Room Air 


 


O2 Flow Rate 2.00   








I&O











Intake and Output 


 


 7/24/23





 00:00


 


Intake Total 1750 ml


 


Balance 1750 ml


 


 


 


Intake Oral 1750 ml


 


# Voids 6


 


# Bowel Movements 2








General:  Alert, Oriented X3, Cooperative


HEENT:  Atraumatic, PERRLA


Lungs:  Clear to Auscultation, Normal Air Movement


Heart:  Normal S1, Normal S2


Abdomen:  Normal Bowel Sounds, Soft


Skin:  No Rashes, No Significant Lesion


Neuro:  Normal Speech


Psych/Mental Status:  Mental Status NL, Mood NL





A/P-Cardiology


Admission Diagnosis


Mitral stenosis, s/p Mitral valve replacement


afib


HTN


DM





Assessment/Plan


Paroxysmal atrial fibrillation, Converted to sinus rhythm.  Maintained on 

Cardizem  mg daily, Amiodarone 200 mg daily.  Continue warfarin.  


Patient does not want to travel to [place] or [place]. INR 2.8. Will have 

her f/u in our office





Mitral valve stenosis, history of MVR with mechanical valve with Dr. Mina at 

 approx 2 weeks ago. Incision and CT sites C/D/I without erythema or drainage.







HTN, continue to monitor blood pressure





HLP, continue to monitor as outpatient





DM, management per primary team








Patient was seen and evaluated with Tara


I interviewed and examined the patient


Discussed management plan, agree with the current scribed note


Patient has paroxysmal atrial fibrillation, converted to sinus rhythm.


She is maintained on Cardizem and amiodarone


Monitor blood pressure and lipids


Chest wound is healing well


Continue on current medication





Supervisory-Addendum Brief


Supervisory Addendum


Participated in pt care:  history, MDM, physical


Personally performed:  exam, history, MDM


Care discussed with:  PA


Results interpretation:  Verified all documentation


Notes:


Patient was seen and evaluated with Tara, examination performed, management 

plan was discussed, agree with the current scribed note, I made few changes to 

the note using Italic font











TARA LOVE  Jul 24, 2023 09:20


VY CORDOVA MD              Jul 24, 2023 14:43

## 2023-07-24 NOTE — WOUND CARE ASSESSMENT
Wound Care Assessment


Date Seen by Provider:  Jul 24, 2023


Time Seen by Provider:  16:42


Chief Complaint


Chest tube wounds


HPI


This pleasant 63 year old patient has an extensive cardiac history with 

prolonged hospitalization. She had a severe mitral stenosis with resulting 

life-threatening CHF, was admitted to , diuresed and dialyzed, and 

subsequently underwent mitral valve replacement. She has struggled with Afib 

with RVR following. She has lost a significant amount of weight since 

hospitalization. However, her albumin is reasonable. Her glycemic control is 

wonderful. She has severe post-operative/anemia of chronic kidney disease. Since

her cardiac output has improved, her GFR has rebounded. She notes that upon d/c 

from [facility], her chest tubes were removed. She does have small dehisced open areas

resulting. Per wound care nursing staff, these have improved significantly 

during her stay. She prefer to avoid silver alginate but is agreeable to Vashe 

cleansing and BFD daily. We will continue as such for now.


Past Medical History:  Admits Diabetes Type II, Admits Heart Disease


CHF, Mitral valve replacement, COPD, refractory anemia, acute renal failure 

(improved), obesity (BMI 41)


Smoking Status:  Former Smoker


Alcohol Use:  Denies Use


Review of Systems


General:  Other (ity)


Pulmonary:  Dyspnea (improved)


Cardiovascular:  Chest Pain (at surgical site)


Neurological:  Weakness





Exam





Vital Signs








  Date Time  Temp Pulse Resp B/P (MAP) Pulse Ox O2 Delivery O2 Flow Rate FiO2


 


7/24/23 13:00  99      


 


7/24/23 09:10      Room Air  


 


7/24/23 08:02 36.0  18 113/55 (74) 97   


 


7/23/23 08:28       2.00 





Capillary Refill :


General Appearance:  no apparent distress, obese


HEENT:  other (hearing wnl)


Neck:  full range of motion


Cardiovascular:  other (edema greatly improved)


Respiratory:  no respiratory distress, no accessory muscle use


Extremities:  normal range of motion


Neurologic/Psychiatric:  alert, normal mood/affect, oriented x 3


Wound assessment:


Superior and inferior wounds with same assessment: 0.3x1.5x0.8cm. The 

epithelialization is small, there is no tunneling or undermining, drainage is 

large and serosanguinous, granulation is large and pink, necrotic is small and 

slough. Margins flat





Results


Laboratory Tests


7/23/23 17:10: Glucometer 105


7/23/23 20:55: Glucometer 92


7/24/23 05:35: Glucometer 101


7/24/23 12:21: Glucometer 111H





Assessment/Plan/Dx


Assessment:





1. Chest tube wounds


2. Refractory anemia


3. Acute renal injury (resolved)


4. DM2


5. COPD


6. Obesity





Plan:





1. Vashe cleansing/soak daily and secure with BFD. Monitor closely for signs of 

infection


2. Defer to primary team


3. Greatly improved


4. Well controlled


5. Defer to primary team


6. Significant weight loss. Defer to primary team. On GlucerZACHARY Johnston MD            Jul 24, 2023 16:48

## 2023-07-24 NOTE — ST DYSPHAGIA EVALUATION
Speech Evaluation-General


Medical Diagnosis


CHF Myopathy


Onset Date:  Jul 11, 2023





Therapy Diagnosis


Therapy Diagnosis:  MILD Oral dysphagia





Precautions


Precautions/Isolations:  Fall Prevention, Standard Precautions





Referral


Referring Physician:  Dr. Villela


Reason for Referral:  Evaluation/Treatment





Medical History


Pertinent Medical History:  Atrial Fib, COPD, GERD, HTN, MI


anemia, cardiomyopathy, CHF, COPD, DM II, edema, GERD, hypokalemia, 

hyponatremia, leukocytosis, obesity, OMEGA, A-Fib, HTN, respiratory failure, 

tobacco abuse, SONALI, depression, tachycardia


Current History


S/P MVR on 7/11/2023; Admitted to ARU on 7/19/2023 for CHF myopathy and proximal

weakness; Admitted to ICU on 7/20/2023 for A-Fib with RVR; Readmitted to ARU on 

7/22/2023


Reviewed History:  Yes





Social History


Current Living Status:  Alone





Speech PLF/Current-Dysphagia


Prior Level of Function


Pt on regular diet with thin liquids.





Subjective


Pt sitting up in chair when SLP arrives. Following cognitive evaluation, pt 

states she has been having difficulty with swallowing. After talking with pt, it

is more oral irritation. Pt had teeth removed at bedside at [facility] prior to 

surgery. Pt states she was not given any antibiotics. Pt pleasant and 

cooperative throughout evaluation.





Cognitive Status


Patient Orientation:  Person, Place, Situation





Oral Motor Skills


Dentition:  Natural (Missing teeth)


Current Food Consistancy:  Regular, Thin Liquids


Ability to Follow Directions:  Excellent


Oral Expression Ability:  No Impairment





Voice


Voice Phonatory-Based Quality:  Normal


Voice Pitch:  Normal


Voice Loudness:  Normal





Face


Facial Symmetry:  Symmetrical





Oral-Facial Assessment


Oral-Facial Dentition:  Normal


Labial Seal Description:  Normal





Dysphagia Evaluation


Dietary Recommendations:  Regular


Liquid Recommendations:  Thin


Dysphagia Evaluation Summary


Pt presenting with mild oral dysphagia due to irritation in mouth potentially 

from having teeth pulled prior to operation. Pt presents with what appears to be

a small blister on the right soft palate, pt has raw area on right gum where 

tooth was pulled, pt also demonstrates redness on lower left gum where tooth was

pulled. Pt denies anything getting caught in her throat or coughing while ea

ting/drinking. She states hard solids make her mouth hurt as it feels 

raw/irritated, however it is improving. Nurse notified and will contact the dr. 

SLP discussed the option of changing to a softer diet consistency, pt denies and

states she is learning which foods she can tolerate orally and which ones she 

cannot.





Speech Short Term Goals


Short Term Goals


Short Term Goals


1. Pt to complete medication and money management tasks with 80% accuracy 

independently.





Speech Long Term Goals


Long Term Goals


1. Pt to complete medication and money management with 100% accuracy 

independently.


2. Tolerate least restrictive diet consistency with no s/sx of aspiration.





Speech-Plan


Patient/Family Goals


Patient/Family Goals:  


Pt's goal is to return home at [facility].





Treatment Plan


Speech Therapy Treatment Plan:  Continue Plan of Care


Frequency:  1 time per week (As needed/consult)


Estimated Hrs Per Day:  .5 hour per day (as needed/consult)


Rehab Potential:  Good


Pt/Family Agrees to Plan:  Yes





Safety Risks/Education


Teaching Recipient:  Patient


Teaching Methods:  Discussion


Response to Teaching:  Verbalize Understanding


Education Topics Provided:  


Pt educated on role of SLP, purpose of evaluation, results, and


recommendations. Pt receptive and verbalized understanding.





Time


Speech Therapy Time In:  10:10


Speech Therapy Time Out:  10:25


DATE:  Jul 24, 2023


Total Billed Time:  15


Billed Treatment Time


Michelle Villa Speech Therapy Jul 24, 2023 10:51

## 2023-07-24 NOTE — PHYSICAL THERAPY EVALUATION
PT Evaluation-General


Medical Diagnosis


Admission Date


Jul 22, 2023 at 13:35


Medical Diagnosis:  CHF Myopathy


Onset Date:  Jul 11, 2023





Therapy Diagnosis


Therapy Diagnosis:  Proximal weakness, Decreased functional mobility, Decreased 

endurance





Precautions


Precautions/Isolations:  Fall Prevention, Standard Precautions, Pressure Ulcer


Sternal precautions; No lifting more than 10lbs





Weight Bear Status


Right Lower Extremity:  Right


Full Weight Bearing


Left Lower Extremity:  Left


Full Weight Bearing





Referral


Physician:  Manohar


Reason for Referral:  Evaluation/Treatment





Medical History


Pertinent Medical History:  Atrial Fib, COPD, GERD, HTN, MI


Additional Medical History


anemia, cardiomyopathy, CHF, COPD, DM II, edema, GERD, hypokalemia, 

hyponatremia, leukocytosis, obesity, OMEGA, A-Fib, HTN, respiratory failure, 

tobacco abuse, SONALI, depression, tachycardia


Current History


S/P MVR on 7/11/2023; Admitted to ARU on 7/19/2023 for CHF myopathy and proximal

weakness; Admitted to ICU on 7/20/2023 for A-Fib with RVR; Readmitted to ARU on 

7/22/2023


Reviewed History:  Yes





Social History


Home:  Single Level


Current Living Status:  Alone


Entry Into Home:  Stairs With Railing


PT Steps Into Home:  3


Pt lives in [place], [place], alone, in a single story home with 3 steps to 

enter/exit with 1 HR. Tub shower with SC and [facility]. Pt has a 4WW and BSC.





Prior


Prior Level of Function


SCALE: Activities may be completed with or without assistive devices.





6-Indepedent-patient completes the activity by him/herself with no assistance 

from a helper.


5-Set-up or Clean-up Assistance-helper sets up or cleans up; patient completes 

activity. [address] assists only prior to or  


    following the activity.


4-Supervision or Touching Assistance-helper provides verbal cues and/or 

touching/steadying and/or contact guard assistance as patient completes 

activity. Assistance may be provided   


    throughout the activity or intermittently.


3-Partial/Moderate Assistance-helper does LESS THAN HALF the effort. [address] 

lifts, holds or supports trunk or limbs, but provides less than half the effort.


2-Substantial/Maximal Assistance-helper does MORE THAN HALF the effort. [address] 

lifts or holds trunk or limbs and provides more than half the effort.


4-Vauqeygqp-jjfigo does ALL the effort. Patient does none of the effort to 

complete the activity. Or, the assistance of 2 or more helpers is required for 

the patient to complete the  


    activity.


If activity was not attempted, code reason:


7-Patient Refused.


9-Not Applicable-not attempted and the patient did not perform the activity 

before the current illness, exacerbation or injury.


10-Not Attempted due to Environmental Limitations-(lack of equipment, weather 

restraints, etc.).


88-Not Attempted due to Medical Conditions or Safety Concerns.


Bed Mobility:  6


Transfers (B,C,W/C):  6


Gait:  6


Stairs:  6


Wheelchair Mobility:  9


Indoor Mobility (Ambulation):  Independent


Stairs:  Independent


Prior Devices Use:  Walker


Prior Device Use:  4WW, SC, BSC


At [facility], pt was Ind with no AD and driving. Pt just recently started using a 

4WW, secondary to B LE and hip weakness. Pt works for [employer] 

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  Pt has a 4WW, SC, and BSC.





PT Evaluation-Current


Subjective


Pt is agreeable to PT. Pt reports 5/10 pain in her sternal incision.





Pain





   Numeric Pain Scale:  5-Moderate Pain


   Location:  Incisional


   Location Body Site:  Chest


Section J - Health Conditions


1. Rarely or not at all


2. Occasionally


3. Frequently


4. Almost constantly


8. Unable to answer


Pain Effect on Sleep:  3


Pain Interference with Therapy:  3


Pain Interference w/Day-to-Day:  3





Pt/Family Goals


Safely return home





Objective


Patient Orientation:  Person, Place, Time, Situation





ROM/Strength


ROM Upper Extremities


See OT note


ROM Lower Extremities


WFL


Strength Upper Extremities


See OT note


Strength Lower Extremities


B ankle MMT = 4-/5


B knee MMT = 4-/5


B hip MMT = 3+/5


Fair - Fair (+) core strength





Integumentary/Posture


Integumentary


See nurses note


Bowel Incontinence:  No


Bladder Incontinence:  No


Posture


Slightly rolled/forward shoulders





Sensory


Vision:  Functional


Hearing:  Functional


Hand Dominance:  Right


Sensation Right Upper Extremit:  Intact


Sensation Left Upper Extremity:  Intact


Sensation Right Lower Extremit:  Intact


Sensation Left Lower Extremity:  Intact





Transfers


Roll Left & Right (QC):  4 (CGA )


Sit to Lying (QC):  4 (CGA )


Lying to Sitting/Side of Bed(Q:  4 (CGA )


Sit to Stand (QC):  4 (CGA )


Chair/Bed-to-Chair Xfer(QC):  4 (CGA )


Toilet Transfer (QC):  4 (CGA )


Car Transfer (QC):  4 (CGA )





Gait


Does the Patient Walk?:  Yes


Mode of Locomotion:  Walk


Anticipated Mode of Locomotion:  Walk


Walk 10 feet (QC):  4 (CGA )


Walk 50 ft with 2 Turns(QC):  4 (CGA )


Walk 150 ft (QC):  4 (CGA )


Walking 10ft/uneven surface-QC:  4 (CGA )


Distance:  150ft


Gait Assistive Device:  Walker 4 Wheeled





Wheelchair Training


Does the Pt Use a Wheelchair?:  No


Wheel 50 ft with 2 turns (QC):  9


Wheel 150 ft (QC):  9


Type of Wheelchair:  N/A





Stairs


#of Steps:  8


1 Step (curb) (QC):  4 (CGA )


4 Steps (QC):  4 (CGA )


12 Steps (QC):  9 (Pt has 3 stairs to enter/exit the home. )


Walking Assistive Device:  Walker





Balance


Sitting Static:  Good


Sitting Dynamic:  Fair (Decreased core strength )


Standing Static:  Good


 Standing Dynamic:  Fair (Decreased core strength )


Picking up an Object (QC):  4 (CGA )


Special Test Comments


KU standing balance scale = 3+/5 (goal = 4+/5)





Treatment


PT eval completed





Assessment/Needs


Pt tolerated PT well with good effort


Rehab Potential:  Good


Post Rehab Potential-Barriers:  Proximal/hip/LE/core weakness


Equipment Needs


Transfer bench





PT Long Term Goals


Long Term Goals


PT Long Term Goals Time Frame:  Jul 29, 2023


Roll Left to Right (QC):  6 (Pt will be Mod I with functional mobility with the 

4WW, in order to be at PLOF and safely return home, alone. )


Sit to Lying (QC):  6 (Pt will be Mod I with functional mobility with the 4WW, 

in order to be at PLOF and safely return home, alone. )


Lying-Sitting on Side/Bed(QC):  6 (Pt will be Mod I with functional mobility 

with the 4WW, in order to be at PLOF and safely return home, alone. )


Sit to Stand (QC):  6 (Pt will be Mod I with functional mobility with the 4WW, 

in order to be at PLOF and safely return home, alone. )


Chair/Bed-to-Chair Xfer(QC):  6 (Pt will be Mod I with functional mobility with 

the 4WW, in order to be at PLOF and safely return home, alone. )


Toilet/Commode Transfer (QC):  6 (Pt will be Mod I with functional mobility with

the 4WW, in order to be at PLOF and safely return home, alone. )


Car Transfer (QC):  6 (Pt will be Mod I with functional mobility with the 4WW, 

in order to be at PLOF and safely return home, alone. )


Does the Patient Walk:  Yes


Walk 10 feet (QC):  6 (Pt will be Mod I with functional mobility with the 4WW, 

in order to be at PLOF and safely return home, alone. )


Walk 10ft-Uneven Surface(QC):  6 (Pt will be Mod I with functional mobility with

the 4WW, in order to be at PLOF and safely return home, alone. )


Walk 50ft with 2 Turns (QC):  6 (Pt will be Mod I with functional mobility with 

the 4WW, in order to be at PLOF and safely return home, alone. )


Walk 150 ft (QC):  6 (Pt will be Mod I with functional mobility with the 4WW, in

order to be at PLOF and safely return home, alone. )


Does the Pt use WC or Scooter?:  No


Wheel 50 feet with 2 turns (QC:  9


Type:  N/A


Wheel 150 feet:  9


Type:  N/A


1 Step (curb) (QC):  6 (Pt will be Mod I with functional mobility with the 4WW, 

in order to be at PLOF and safely return home, alone. )


4 Steps (QC):  6 (Pt will be Mod I with functional mobility with the 4WW, in 

order to be at PLOF and safely return home, alone. )


12 Steps (QC):  9 (Pt has 3 steps to enter/exit home )


Picking up an Object (QC):  6 (Pt will be Mod I with functional mobility with 

the 4WW, in order to be at PLOF and safely return home, alone. )


KU standing balance goal = 4+/5





PT Plan


Problem List


Problem List:  Activity Tolerance, Functional Strength, Safety, Balance, Gait, 

Transfer, Bed Mobility, ROM





Treatment/Plan


Treatment Plan:  Continue Plan of Care


Treatment Plan:  Bed Mobility, Concurrent Therapy, Education, Functional 

Activity Lazaro, Functional Strength, Group Therapy, Gait, Safety, Therapeutic 

Exercise, Transfers


Treatment Duration:  Jul 29, 2023


Frequency:  At least 5 of 7 days/Wk (IRF)


Estimated Hrs Per Day:  1.5 hours per day


Patient and/or Family Agrees t:  Yes





Safety Risks/Education


Patient Education:  Gait Training, Transfer Techniques, Steps, Reviewed 

Precautions, Safety Issues


Teaching Recipient:  Patient


Teaching Methods:  Demonstration, Discussion


Response to Teaching:  Verbalize Understanding, Return Demonstration, 

Reinforcement Needed





Discharge Recommendations


Plan


Pt would benefit from skilled PT to improve proximal weakness and safely d/c 

home


Therapy Discharge Recommendati:  Home & Family


Equpiment Recommendations-D/C:  Other, Please Explain (Transfer bench )


Discharge Status/Home Program


Cont per POC


Barriers to Progress


Proximal/Hip/core/LE weakness


Target Placement


Home





Time


Time In:  800


Time Out:  830


DATE:  Jul 24, 2023


Total Billed Treatment Time:  30


Total Billed Treatment


30 min 


1 visit 


BAKARI MÉNDEZ PT                Jul 24, 2023 08:15

## 2023-07-25 VITALS — SYSTOLIC BLOOD PRESSURE: 120 MMHG | DIASTOLIC BLOOD PRESSURE: 59 MMHG

## 2023-07-25 VITALS — DIASTOLIC BLOOD PRESSURE: 52 MMHG | SYSTOLIC BLOOD PRESSURE: 104 MMHG

## 2023-07-25 LAB
ALBUMIN SERPL-MCNC: 3.3 GM/DL (ref 3.2–4.5)
ALP SERPL-CCNC: 79 U/L (ref 40–136)
ALT SERPL-CCNC: 19 U/L (ref 0–55)
BASOPHILS # BLD AUTO: 0.1 10^3/UL (ref 0–0.1)
BASOPHILS NFR BLD AUTO: 1 % (ref 0–10)
BILIRUB SERPL-MCNC: 0.6 MG/DL (ref 0.1–1)
BUN/CREAT SERPL: 23
CALCIUM SERPL-MCNC: 9.3 MG/DL (ref 8.5–10.1)
CHLORIDE SERPL-SCNC: 98 MMOL/L (ref 98–107)
CO2 SERPL-SCNC: 28 MMOL/L (ref 21–32)
CREAT SERPL-MCNC: 0.91 MG/DL (ref 0.6–1.3)
EOSINOPHIL # BLD AUTO: 0.6 10^3/UL (ref 0–0.3)
EOSINOPHIL NFR BLD AUTO: 6 % (ref 0–10)
GFR SERPLBLD BASED ON 1.73 SQ M-ARVRAT: 71 ML/MIN
GLUCOSE SERPL-MCNC: 106 MG/DL (ref 70–105)
HCT VFR BLD CALC: 31 % (ref 35–52)
HGB BLD-MCNC: 8.9 G/DL (ref 11.5–16)
INR PPP: 3.2 (ref 0.8–1.4)
LYMPHOCYTES # BLD AUTO: 1.7 10^3/UL (ref 1–4)
LYMPHOCYTES NFR BLD AUTO: 15 % (ref 12–44)
MANUAL DIFFERENTIAL PERFORMED BLD QL: NO
MCH RBC QN AUTO: 26 PG (ref 25–34)
MCHC RBC AUTO-ENTMCNC: 29 G/DL (ref 32–36)
MCV RBC AUTO: 90 FL (ref 80–99)
MONOCYTES # BLD AUTO: 1.1 10^3/UL (ref 0–1)
MONOCYTES NFR BLD AUTO: 10 % (ref 0–12)
NEUTROPHILS # BLD AUTO: 8 10^3/UL (ref 1.8–7.8)
NEUTROPHILS NFR BLD AUTO: 69 % (ref 42–75)
PLATELET # BLD: 273 10^3/UL (ref 130–400)
PMV BLD AUTO: 8.8 FL (ref 9–12.2)
POTASSIUM SERPL-SCNC: 4.6 MMOL/L (ref 3.6–5)
PROT SERPL-MCNC: 6.4 GM/DL (ref 6.4–8.2)
PROTHROMBIN TIME: 32.3 SEC (ref 12.2–14.7)
SODIUM SERPL-SCNC: 136 MMOL/L (ref 135–145)
WBC # BLD AUTO: 11.7 10^3/UL (ref 4.3–11)

## 2023-07-25 RX ADMIN — DOCUSATE SODIUM AND SENNOSIDES SCH EA: 8.6; 5 TABLET, FILM COATED ORAL at 21:30

## 2023-07-25 RX ADMIN — PREDNISOLONE SCH ML: 15 SOLUTION ORAL at 13:04

## 2023-07-25 RX ADMIN — WARFARIN SODIUM SCH MG: 2.5 TABLET ORAL at 17:13

## 2023-07-25 RX ADMIN — INSULIN ASPART SCH UNIT: 100 INJECTION, SOLUTION INTRAVENOUS; SUBCUTANEOUS at 15:51

## 2023-07-25 RX ADMIN — POTASSIUM CHLORIDE SCH MEQ: 750 TABLET, FILM COATED, EXTENDED RELEASE ORAL at 21:31

## 2023-07-25 RX ADMIN — PREDNISOLONE SCH ML: 15 SOLUTION ORAL at 08:12

## 2023-07-25 RX ADMIN — INSULIN ASPART SCH UNIT: 100 INJECTION, SOLUTION INTRAVENOUS; SUBCUTANEOUS at 21:30

## 2023-07-25 RX ADMIN — PREDNISOLONE SCH ML: 15 SOLUTION ORAL at 21:31

## 2023-07-25 RX ADMIN — CARVEDILOL SCH EA: 25 TABLET, FILM COATED ORAL at 08:11

## 2023-07-25 RX ADMIN — DOCUSATE SODIUM SCH MG: 100 CAPSULE ORAL at 08:08

## 2023-07-25 RX ADMIN — CARVEDILOL SCH MG: 25 TABLET, FILM COATED ORAL at 08:11

## 2023-07-25 RX ADMIN — INSULIN ASPART SCH UNIT: 100 INJECTION, SOLUTION INTRAVENOUS; SUBCUTANEOUS at 11:38

## 2023-07-25 RX ADMIN — BUMETANIDE SCH MG: 1 TABLET ORAL at 06:32

## 2023-07-25 RX ADMIN — DOCUSATE SODIUM AND SENNOSIDES SCH EA: 8.6; 5 TABLET, FILM COATED ORAL at 08:07

## 2023-07-25 RX ADMIN — ASPIRIN SCH MG: 81 TABLET ORAL at 08:08

## 2023-07-25 RX ADMIN — AMIODARONE HYDROCHLORIDE SCH MG: 200 TABLET ORAL at 08:08

## 2023-07-25 RX ADMIN — DOCUSATE SODIUM SCH MG: 100 CAPSULE ORAL at 21:30

## 2023-07-25 RX ADMIN — WARFARIN SODIUM SCH MG: 4 TABLET ORAL at 17:13

## 2023-07-25 RX ADMIN — Medication SCH ML: at 06:32

## 2023-07-25 RX ADMIN — POTASSIUM CHLORIDE SCH MEQ: 750 TABLET, FILM COATED, EXTENDED RELEASE ORAL at 08:07

## 2023-07-25 RX ADMIN — PREDNISOLONE SCH ML: 15 SOLUTION ORAL at 17:13

## 2023-07-25 RX ADMIN — POLYETHYLENE GLYCOL (3350) SCH GM: 17 POWDER, FOR SOLUTION ORAL at 21:30

## 2023-07-25 RX ADMIN — INSULIN ASPART SCH UNIT: 100 INJECTION, SOLUTION INTRAVENOUS; SUBCUTANEOUS at 07:23

## 2023-07-25 RX ADMIN — PANTOPRAZOLE SCH MG: 20 TABLET, DELAYED RELEASE ORAL at 08:07

## 2023-07-25 RX ADMIN — DILTIAZEM HYDROCHLORIDE SCH MG: 120 CAPSULE, COATED, EXTENDED RELEASE ORAL at 08:08

## 2023-07-25 RX ADMIN — POLYETHYLENE GLYCOL (3350) SCH GM: 17 POWDER, FOR SOLUTION ORAL at 08:08

## 2023-07-25 RX ADMIN — FLUTICASONE FUROATE AND VILANTEROL TRIFENATATE SCH EACH: 100; 25 POWDER RESPIRATORY (INHALATION) at 09:51

## 2023-07-25 RX ADMIN — BUMETANIDE SCH MG: 1 TABLET ORAL at 17:13

## 2023-07-25 NOTE — SPEECH THERAPY DAILY NOTE
Speech Daily Progress Note


Subjective


Date Seen by Provider:  Jul 25, 2023


Time Seen by Provider:  09:30


Pt sitting up in recliner on her phone when the SLP enters the room. Pt states 

she is doing well and she had a pain medication earlier this morning. Pt 

pleasant and cooperative throughout the session.





Pain





   Numeric Pain Scale:  4


   Comment:  st[place]





Objective


Pt completes check writing and balancing a checkbook register with 100% accuracy

independently.


Pt completes medication management task with 100% accuracy independently.


Pt states the mouthwash that was prescribed by the dr has helped her mouth sig

nificantly and she can already tell a difference with the foods she can eat.





Assessment


Assessment Current Status:  Good Progress





Treatment Plan


Discontinue ST, Goals Met





Speech Short Term Goals


Short Term Goals


Short Term Goals


1. Pt to complete medication and money management tasks with 80% accuracy 

independently.





Speech Long Term Goals


Long Term Goals


1. Pt to complete medication and money management with 100% accuracy 

independently.


2. Tolerate least restrictive diet consistency with no s/sx of aspiration.





Speech-Plan


Patient/Family Goals


Patient/Family Goals:  


Pt's goal is to return home.





Treatment Plan


Speech Therapy Treatment Plan:  Discontinue ST, Goals Met


Treatment Duration:  Jul 24, 2023


Frequency:  Modified Program (IRF)


Estimated Hrs Per Day:  Other


Rehab Potential:  Good


Pt/Family Agrees to Plan:  Yes





Safety Risks/Education


Teaching Recipient:  Patient


Teaching Methods:  Discussion


Response to Teaching:  Verbalize Understanding


Education Topics Provided:  


Pt educated on purpose of speech therapy tasks and recommendation to


discharge from speech therapy. Pt receptive and verbalized understanding.





Time


Speech Therapy Time In:  09:30


Speech Therapy Time Out:  09:50


DATE:  Jul 25, 2023


Total Billed Time:  20


Billed Treatment Time


S/L Michelle Sinclair Speech Therapy Jul 25, 2023 10:18

## 2023-07-25 NOTE — CARDIOLOGY PROGRESS NOTE
Subjective


Date Seen by Provider:  Jul 25, 2023


Time Seen by Provider:  08:05


Subjective/Events-last exam


Patient with PT. Denies any chest pain or dyspnea.





Objective-Cardiology


Exam


Last Set of Vital Signs





Vital Signs








 7/23/23 7/25/23





 08:28 09:51


 


Pulse Ox  97


 


O2 Delivery  Room Air


 


O2 Flow Rate 2.00 








I&O











Intake and Output 


 


 7/25/23





 00:00


 


Intake Total 1450 ml


 


Balance 1450 ml


 


 


 


Intake Oral 1450 ml


 


# Voids 6


 


# Bowel Movements 1








General:  Alert, Oriented X3, Cooperative


HEENT:  Atraumatic, PERRLA


Lungs:  Clear to Auscultation, Normal Air Movement


Heart:  Normal S1, Normal S2


Abdomen:  Normal Bowel Sounds, Soft


Skin:  No Rashes, No Significant Lesion


Neuro:  Normal Speech


Psych/Mental Status:  Mental Status NL, Mood NL





Results


Lab


Laboratory Tests


7/25/23 06:23














A/P-Cardiology


Admission Diagnosis


Mitral stenosis, s/p Mitral valve replacement


afib


HTN


DM





Assessment/Plan


Paroxysmal atrial fibrillation, Converted to sinus rhythm.  Maintained on 

Cardizem  mg daily, Amiodarone 200 mg daily.  Continue warfarin.  


Patient does not want to travel to [place] or [place]. INR 3.2. Will have 

her f/u in our office





Mitral valve stenosis, history of MVR with mechanical valve with Dr. Mina at 

 approx 2 weeks ago. Incision and CT sites C/D/I without erythema or drainage.







HTN, continue to monitor blood pressure





HLP, continue to monitor as outpatient





DM, management per primary team





Generalized debility/weakness, continue PT/OT





Supervisory-Addendum Brief


Supervisory Addendum


Participated in pt care:  history, MDM, physical


Personally performed:  exam, history, MDM


Care discussed with:  PA


Results interpretation:  Verified all documentation


Notes:


Patient was seen and evaluated with Tara, examination performed, management 

plan was discussed, agree with the current scribed note, I made few changes to 

the note using Italic font


Patient was seen during physical therapy session, has been doing well.  Denies 

any chest pain


Improving slowly


INR 3.2, will monitor daily INR.


Continue on current dose of warfarin for now














TARA LOVE  Jul 25, 2023 08:56


VY CORDOVA MD              Jul 25, 2023 10:41

## 2023-07-25 NOTE — OCCUPATIONAL THER DAILY NOTE
OT Current Status-Daily Note


Subjective


Pt sitting in chair upon arrival, alert and cooperative, agreed to therapy. No 

pain mentioned at this time.





Mental Status/Objective


Patient Orientation:  Person, Place, Time, Situation





ADL-Treatment


Pt ambulated to toilet in bathroom, using 4WW, independently. Pt completed 

toilet transfer and toilet hygiene, required therapist to cleanse buttocks, due 

to limited reach. Therapist recomended and asked about pt using toilet tongs and

pt refused to use, pt has own method of cleansing buttocks at home, using a l

pamela down method on the bed. Pt transferred off toilet independently. Pt used 

4WW to get to sink and independently completed oral hygiene and grooming, pt did

require a lengthy recovery break after standing at sink. Pt then ambulated on 

ARU to shower room. Pt demonstrated how pt gets into tub shower at home using 

bar that is install in her home already, pt does have shower bench chair that is

small. Therapist showed pt and recommended she get a tub transfer bench, pt 

refused and stated " I don't want to spend the money on that". Pt demonstrated 

ability to safely get in and out of tub shower using only shower bench and 

similar grab bar she has at home. Pt then ambulated back to room using 4WW, 

requiring multiple lengthy recovery breaks during that time. Pt sat back down in

chair in room, and took another recovery breaks. Pt completed 3 B UE required 

skilled instruction for correct technique using yellow theraband, staying with 

in sternal precautions, pt did well. Pt was left sitting in chair, call light 

within reach, all needs met at this time.


Therapy Code Descriptions/Definitions 





Functional [place] Measure:


0=Not Assessed/NA        4=Minimal Assistance


1=Total Assistance        5=Supervision or Setup


2=Maximal Assistance  6=Modified [place]


3=Moderate Assistance 7=Complete IndependenceSCALE: Activities may be completed 

with or without assistive devices.





6-Indepedent-patient completes the activity by him/herself with no assistance 

from a helper.


5-Set-up or Clean-up Assistance-helper sets up or cleans up; patient completes 

activity. [place] assists only prior to or  


    following the activity.


4-Supervision or Touching Assistance-helper provides verbal cues and/or touc

bright/steadying and/or contact guard assistance as patient completes activity. 

Assistance may be provided   


    throughout the activity or intermittently.


3-Partial/Moderate Assistance-helper does LESS THAN HALF the effort. [place] 

lifts, holds or supports trunk or limbs, but provides less than half the effort.


2-Substantial/Maximal Assistance-helper does MORE THAN HALF the effort. [place] 

lifts or holds trunk or limbs and provides more than half the effort.


5-Ptwsxugst-eipltl does ALL the effort. Patient does none of the effort to 

complete the activity. Or, the assistance of 2 or more helpers is required for 

the patient to complete the  


    activity.


If activity was not attempted, code reason:


7-Patient Refused.


9-Not Applicable-not attempted and the patient did not perform the activity 

before the current illness, exacerbation or injury.


10-Not Attempted due to Environmental Limitations-(lack of equipment, weather 

restraints, etc.).


88-Not Attempted due to Medical Conditions or Safety Concerns.





Education


OT Patient Education:  Energy conservation, Progress toward Goal/Update tx plan,

Purpose of tx/functional activities, Transfer techniques


Teaching Recipient:  Patient


Teaching Methods:  Demonstration, Handout, Discussion


Response to Teaching:  Verbalize Understanding, Return Demonstration





OT Short Term Goals


Short Term Goals


Time Frame:  2023


Toileting hygiene:  5


Shower/bathe self:  5


Upper body dressin


Lower body dressin





OT Long Term Goals


Long Term Goals


Acute change in mental status:  0


Inattention:  0


Disorganized thinkin


Altered level of consciousness:  0


Oral Hygiene (QC):  6


Toileting Hygiene (QC):  6


Shower/Bathe Self (QC):  6


Upper Body Dressing (QC):  6


Lower Body Dressing (QC):  6


On/Off Footwear (QC):  6


1=Demonstrate adherence to instructed precautions during ADL tasks.


2=Patient will verbalize/demonstrate understanding of assistive 

devices/modifications for ADL.


3=Patient will improve strength/tolerance for activity to enable patient to 

perform ADL's.





OT Education/Plan


Problem List/Assessment


Assessment:  Decreased Activ Tolerance, Decreased UE Strength





Discharge Recommendations


Plan/Recommendations:  Continue POC





Treatment Plan/Plan of Care


Patient would benefit from OT for education, treatment and training to promote 

independence in ADL's, mobility, safety and/or upper extremity function for 

ADL's.


Plan of Care:  ADL Retraining, Concurrent Therapy, Functional Mobility, Group 

Exercise/Act as Ind, UE Funct Exercise/Act


Treatment Duration:  2023


Frequency:  At least 5 of 7 days/Wk (IRF)


Estimated Hrs Per Day:  1.5 hours per day


Agreement:  Yes


Rehab Potential:  Good





Time


Start Time:  10:00


Stop Time:  11:15


DATE:  2023


Total Time Billed (hr/min):  75


Billed Treatment Time


1 visit ADL 2 (30) EX 2 (30)  FA 1 (15)  (75 total)











Tonya Castrejon COTA          2023 11:11

## 2023-07-25 NOTE — PHYSICAL THERAPY DAILY NOTE
PT Daily Note-Current


Subjective


Pt is sitting in recliner upon arrival.  Pt agrees to walk for afternoon tx.





Pain





   Location:  No Pain Reported


Section J - Health Conditions


1. Rarely or not at all


2. Occasionally


3. Frequently


4. Almost constantly


8. Unable to answer


Pain Effect on Sleep:  2


Pain Interference with Therapy:  2


Pain Interference w/Day-to-Day:  2





Mental Status


Patient Orientation:  Person, Place, Time, Situation


Attachments:  Other-See Comments (Telemetry)





Transfers


SCALE: Activities may be completed with or without assistive devices.





6-Indepedent-patient completes the activity by him/herself with no assistance 

from a helper.


5-Set-up or Clean-up Assistance-helper sets up or cleans up; patient completes 

activity. [place] assists only prior to or  


    following the activity.


4-Supervision or Touching Assistance-helper provides verbal cues and/or 

touching/steadying and/or contact guard assistance as patient completes 

activity. Assistance may be provided   


    throughout the activity or intermittently.


3-Partial/Moderate Assistance-helper does LESS THAN HALF the effort. [place] 

lifts, holds or supports trunk or limbs, but provides less than half the effort.


2-Substantial/Maximal Assistance-helper does MORE THAN HALF the effort. [place] 

lifts or holds trunk or limbs and provides more than half the effort.


7-Lfskesvaw-ustnub does ALL the effort. Patient does none of the effort to 

complete the activity. Or, the assistance of 2 or more helpers is required for 

the patient to complete the  


    activity.


If activity was not attempted, code reason:


7-Patient Refused.


9-Not Applicable-not attempted and the patient did not perform the activity 

before the current illness, exacerbation or injury.


10-Not Attempted due to Environmental Limitations-(lack of equipment, weather 

restraints, etc.).


88-Not Attempted due to Medical Conditions or Safety Concerns.


Sit to Stand (QC):  5


Toilet Transfer (QC):  5





Weight Bearing


Right Lower Extremity:  Right


Full Weight Bearing


Left Lower Extremity:  Left


Full Weight Bearing





Gait Training


Does the Patient Walk?:  Yes


Distance:  151', 256'


Walk 10 feet (QC):  5


Walk 50 ft with 2 Turns(QC):  5


Walk 150 ft (QC):  5


Gait Assistive Device:  Walker 4 Wheeled





Treatments


TF to standing and amb. in hallway before taking RB.  After RB, pt amb. in 

hallway again before resting then using BR, completing pericare independently.  

Pt returns to EOB & O2 is checked before returning to rest Supine in bed w/HOB 

raised to aid w/better breathing while laying.  All needs met, call light in 

hand.





Assessment


Current Status:  Good Progress


Pt has jesi. increased distance amb. & O2 checked at 96%.





PT Long Term Goals


Long Term Goals


PT Long Term Goals Time Frame:  Jul 29, 2023


Roll Left & Right (QC):  6 (Pt will be Mod I with functional mobility with the 

4WW, in order to be at PLOF and safely return home, alone. )


Sit to Lying (QC):  6 (Pt will be Mod I with functional mobility with the 4WW, 

in order to be at PLOF and safely return home, alone. )


Lying-Sitting on Side/Bed(QC):  6 (Pt will be Mod I with functional mobility 

with the 4WW, in order to be at PLOF and safely return home, alone. )


Sit to Stand (QC):  6 (Pt will be Mod I with functional mobility with the 4WW, 

in order to be at PLOF and safely return home, alone. )


Chair/Bed-to-Chair Xfer(QC):  6 (Pt will be Mod I with functional mobility with 

the 4WW, in order to be at PLOF and safely return home, alone. )


Toilet Transfer (QC):  6 (Pt will be Mod I with functional mobility with the 

4WW, in order to be at PLOF and safely return home, alone. )


Car Transfer (QC):  6 (Pt will be Mod I with functional mobility with the 4WW, 

in order to be at PLOF and safely return home, alone. )


Does the Patient Walk:  Yes


Walk 10 feet (QC):  6 (Pt will be Mod I with functional mobility with the 4WW, 

in order to be at PLOF and safely return home, alone. )


Walk 50ft with 2 Turns (QC):  6 (Pt will be Mod I with functional mobility with 

the 4WW, in order to be at PLOF and safely return home, alone. )


Walk 150 ft (QC):  6 (Pt will be Mod I with functional mobility with the 4WW, in

order to be at PLOF and safely return home, alone. )


Walking 10ft on Uneven Surface:  6 (Pt will be Mod I with functional mobility 

with the 4WW, in order to be at PLOF and safely return home, alone. )


1 Step (curb) (QC):  6 (Pt will be Mod I with functional mobility with the 4WW, 

in order to be at PLOF and safely return home, alone. )


4 Steps (QC):  6 (Pt will be Mod I with functional mobility with the 4WW, in 

order to be at PLOF and safely return home, alone. )


12 Steps (QC):  9 (Pt has 3 steps to enter/exit home )


Picking up an Object (QC):  6 (Pt will be Mod I with functional mobility with 

the 4WW, in order to be at PLOF and safely return home, alone. )


Does the Pt use WC or Scooter?:  No


Wheel 50 feet with 2 turns (QC:  9


Type:  N/A


Wheel 150 feet:  9


Type:  N/A





PT Plan


Problem List


Problem List:  Activity Tolerance





Treatment/Plan


Treatment Plan:  Continue Plan of Care


Treatment Plan:  Bed Mobility, Concurrent Therapy, Education, Functional 

Activity Lazaro, Functional Strength, Group Therapy, Gait, Safety, Therapeutic 

Exercise, Transfers


Treatment Duration:  Jul 29, 2023


Frequency:  At least 5 of 7 days/Wk (IRF)


Estimated Hrs Per Day:  1.5 hours per day


Patient and/or Family Agrees t:  Yes





Time


Time In:  1330


Time Out:  1400


DATE:  Jul 25, 2023


Total Billed Treatment Time:  30


Total Billed Treatment


1, GT x2 (30m)











JANIS CRAIG PTA              Jul 25, 2023 14:45

## 2023-07-25 NOTE — PHYSICAL THERAPY DAILY NOTE
PT Daily Note-Current


Subjective


Pt sitting in recliner upon arrival.  Nurse had just given morning meds. Pt 

reports feeling better each day and agrees to PT.





Pain





   Numeric Pain Scale:  3


   Location:  Incisional


   Location Body Site:  Chest


   Pain Description:  Ache


Section J - Health Conditions


1. Rarely or not at all


2. Occasionally


3. Frequently


4. Almost constantly


8. Unable to answer


Pain Effect on Sleep:  2


Pain Interference with Therapy:  2


Pain Interference w/Day-to-Day:  2





Mental Status


Patient Orientation:  Person, Place, Time, Situation





Transfers


SCALE: Activities may be completed with or without assistive devices.





6-Indepedent-patient completes the activity by him/herself with no assistance 

from a helper.


5-Set-up or Clean-up Assistance-helper sets up or cleans up; patient completes 

activity. [place] assists only prior to or  


    following the activity.


4-Supervision or Touching Assistance-helper provides verbal cues and/or 

touching/steadying and/or contact guard assistance as patient completes 

activity. Assistance may be provided   


    throughout the activity or intermittently.


3-Partial/Moderate Assistance-helper does LESS THAN HALF the effort. [place] 

lifts, holds or supports trunk or limbs, but provides less than half the effort.


2-Substantial/Maximal Assistance-helper does MORE THAN HALF the effort. [place] 

lifts or holds trunk or limbs and provides more than half the effort.


0-Swinpftxm-nuhguw does ALL the effort. Patient does none of the effort to 

complete the activity. Or, the assistance of 2 or more helpers is required for 

the patient to complete the  


    activity.


If activity was not attempted, code reason:


7-Patient Refused.


9-Not Applicable-not attempted and the patient did not perform the activity 

before the current illness, exacerbation or injury.


10-Not Attempted due to Environmental Limitations-(lack of equipment, weather 

restraints, etc.).


88-Not Attempted due to Medical Conditions or Safety Concerns.


Sit to Stand (QC):  5 (SBA)





Weight Bearing


Right Lower Extremity:  Right


Full Weight Bearing


Left Lower Extremity:  Left


Full Weight Bearing





Gait Training


Does the Patient Walk?:  Yes


Distance:  100' x2


Walk 10 feet (QC):  5 (SBA)


Walk 50 ft with 2 Turns(QC):  5 (SBA)


Walk 150 ft (QC):  5 (SBA)


Gait Assistive Device:  Walker 4 Wheeled


Pt walks w/slightly flexed posture, flexing at hips due to proximal weakness.





Exercises


Supine Ex:  Ankle pumps, Quad Set, Heel Slides, Hip abd/add


Seated Therapy Exercises:  Ankle pumps, Long arc quads, Hip flexion, Hip 

abd/add, Glut set


Seated Reps:  15


NuStep Minutes:  5


 NuStep Workload:  3





Treatments


Pt Educ. over benefits of PT for increasing strength/activity jesi. for improved 

progress of home.  TF to standing and amb. in hallway.  Pt uses NuStep before 

taking RB.  PTA issues and reviews written HEP for Supine & Seated Ex.  Pt 

returns to recliner to rest w/all needs met, call light in hand.





Assessment


Current Status:  Good Progress


Pt continues to walk w/flexed posture at hips due to proximal weakness.  PTA 

issues EX and works w/NuStep for strength & act. jesi.





PT Long Term Goals


Long Term Goals


PT Long Term Goals Time Frame:  Jul 29, 2023


Roll Left & Right (QC):  6 (Pt will be Mod I with functional mobility with the 

4WW, in order to be at PLOF and safely return home, alone. )


Sit to Lying (QC):  6 (Pt will be Mod I with functional mobility with the 4WW, 

in order to be at PLOF and safely return home, alone. )


Lying-Sitting on Side/Bed(QC):  6 (Pt will be Mod I with functional mobility 

with the 4WW, in order to be at PLOF and safely return home, alone. )


Sit to Stand (QC):  6 (Pt will be Mod I with functional mobility with the 4WW, 

in order to be at PLOF and safely return home, alone. )


Chair/Bed-to-Chair Xfer(QC):  6 (Pt will be Mod I with functional mobility with 

the 4WW, in order to be at PLOF and safely return home, alone. )


Toilet Transfer (QC):  6 (Pt will be Mod I with functional mobility with the 

4WW, in order to be at PLOF and safely return home, alone. )


Car Transfer (QC):  6 (Pt will be Mod I with functional mobility with the 4WW, 

in order to be at PLOF and safely return home, alone. )


Does the Patient Walk:  Yes


Walk 10 feet (QC):  6 (Pt will be Mod I with functional mobility with the 4WW, 

in order to be at PLOF and safely return home, alone. )


Walk 50ft with 2 Turns (QC):  6 (Pt will be Mod I with functional mobility with 

the 4WW, in order to be at PLOF and safely return home, alone. )


Walk 150 ft (QC):  6 (Pt will be Mod I with functional mobility with the 4WW, in

order to be at PLOF and safely return home, alone. )


Walking 10ft on Uneven Surface:  6 (Pt will be Mod I with functional mobility 

with the 4WW, in order to be at PLOF and safely return home, alone. )


1 Step (curb) (QC):  6 (Pt will be Mod I with functional mobility with the 4WW, 

in order to be at PLOF and safely return home, alone. )


4 Steps (QC):  6 (Pt will be Mod I with functional mobility with the 4WW, in 

order to be at PLOF and safely return home, alone. )


12 Steps (QC):  9 (Pt has 3 steps to enter/exit home )


Picking up an Object (QC):  6 (Pt will be Mod I with functional mobility with 

the 4WW, in order to be at PLOF and safely return home, alone. )


Does the Pt use WC or Scooter?:  No


Wheel 50 feet with 2 turns (QC:  9


Type:  N/A


Wheel 150 feet:  9


Type:  N/A





PT Plan


Problem List


Problem List:  Activity Tolerance, Functional Strength, Gait





Treatment/Plan


Treatment Plan:  Continue Plan of Care


Treatment Plan:  Bed Mobility, Concurrent Therapy, Education, Functional 

Activity Lazaro, Functional Strength, Group Therapy, Gait, Safety, Therapeutic 

Exercise, Transfers


Treatment Duration:  Jul 29, 2023


Frequency:  At least 5 of 7 days/Wk (IRF)


Estimated Hrs Per Day:  1.5 hours per day


Patient and/or Family Agrees t:  Yes





Safety Risks/Education


Patient Education:  Gait Training, Issued Written HEP, Correct Positioning, 

Safety Issues


Teaching Recipient:  Patient


Teaching Methods:  Discussion


Response to Teaching:  Verbalize Understanding





Time


Time In:  800


Time Out:  900


DATE:  Jul 25, 2023


Total Billed Treatment Time:  60


Total Billed Treatment


1, GT (20m), EX x2 (25m) & FA (15m)











JANIS CRAIG PTA              Jul 25, 2023 09:08

## 2023-07-25 NOTE — PM&R PROGRESS NOTE
Subjective


HPI/CC On Admission


Date Seen by Provider:  Jul 25, 2023


Time Seen by Provider:  11:00


Subjective/Events-last exam


7/25/2023:


Doing well


Increased ambulation


No falls


Labs stable








7/24/2023:


Doing well


Monitor closely


Lungs are clear


No pain


Reviewed meds








7/23/2023:


Doing well


Anxious about "further complications" and I tired to reassure


Patient has significant mental illness giving rise to anxiety and despondency 

and tearfulness





Review of Systems


General:  Fatigue, Malaise





Objective


Exam


Vital Signs





Vital Signs








  Date Time  Temp Pulse Resp B/P (MAP) Pulse Ox O2 Delivery O2 Flow Rate FiO2


 


7/26/23 01:00  85      


 


7/25/23 21:30     97 Room Air  


 


7/25/23 20:00 35.9  18 104/52 (69)    


 


7/23/23 08:28       2.00 





Capillary Refill :


General Appearance:  No Apparent Distress, WD/WN, Chronically ill, Obese


HEENT:  PERRL/EOMI, Normal ENT Inspection, Pharynx Normal


Neck:  Full Range of Motion, Normal Inspection, Non Tender, Supple, Carotid 

Bruit


Respiratory:  Chest Non Tender, Lungs Clear, Normal Breath Sounds, No Accessory 

Muscle Use, No Respiratory Distress


Cardiovascular:  Regular Rate, Rhythm, No Edema, No Gallop, No JVD, No Murmur, 

Normal Peripheral Pulses


Gastrointestinal:  Normal Bowel Sounds, No Organomegaly, No Pulsatile Mass, Non 

Tender, Soft


Back:  Normal Inspection, No CVA Tenderness, No Vertebral Tenderness


Extremity:  Normal Capillary Refill, Normal Inspection, Normal Range of Motion, 

Non Tender, No Calf Tenderness, No Pedal Edema


Neurologic/Psychiatric:  Alert, Oriented x3, CNs II-XII Norm as Tested, Abnormal

Gait, Depressed Affect, Motor Weakness (generalized)


Skin:  Normal Color, Warm/Dry


Lymphatic:  No Adenopathy





Results/Procedures


Lab


Laboratory Tests


7/25/23 06:23








Patient resulted labs reviewed.





FIM


Transfers


Therapy Code Descriptions/Definitions 





Functional [place] Measure:


0=Not Assessed/NA        4=Minimal Assistance


1=Total Assistance        5=Supervision or Setup


2=Maximal Assistance  6=Modified [place]


3=Moderate Assistance 7=Complete IndependenceSCALE: Activities may be completed 

with or without assistive devices.





6-Indepedent-patient completes the activity by him/herself with no assistance 

from a helper.


5-Set-up or Clean-up Assistance-helper sets up or cleans up; patient completes 

activity. [place] assists only prior to or  


    following the activity.


4-Supervision or Touching Assistance-helper provides verbal cues and/or 

touching/steadying and/or contact guard assistance as patient completes 

activity. Assistance may be provided   


    throughout the activity or intermittently.


3-Partial/Moderate Assistance-helper does LESS THAN HALF the effort. [place] 

lifts, holds or supports trunk or limbs, but provides less than half the effort.


2-Substantial/Maximal Assistance-helper does MORE THAN HALF the effort. [place] 

lifts or holds trunk or limbs and provides more than half the effort.


0-Gikmrfzyj-perfnl does ALL the effort. Patient does none of the effort to 

complete the activity. Or, the assistance of 2 or more helpers is required for t

he patient to complete the  


    activity.


If activity was not attempted, code reason:


7-Patient Refused.


9-Not Applicable-not attempted and the patient did not perform the activity 

before the current illness, exacerbation or injury.


10-Not Attempted due to Environmental Limitations-(lack of equipment, weather 

restraints, etc.).


88-Not Attempted due to Medical Conditions or Safety Concerns.


Roll Left to Right (QC):  4 (CGA )


Sit to Lying (QC):  4 (CGA )


Sit to Stand (QC):  4 (CGA )


Chair/Bed-to-Chair Xfer(QC):  4 (CGA )


Car Transfer (QC):  4 (CGA )





Gait Training


Does the Patient Walk?:  Yes


Walk 10 feet (QC):  4 (CGA )


Walk 50 ft with 2 Turns(QC):  4 (CGA )


Walk 150 ft (QC):  4 (CGA )


Walking 10ft/uneven surface-QC:  4 (CGA )


Gait Assistive Device:  Walker 4 Wheeled





Wheelchair Training


Does the Pt Use a Wheelchair?:  No


Wheel 50 ft with 2 turns (QC):  9


Wheel 150 ft (QC):  9


Type of Wheelchair:  N/A





Stair Training


#of Steps:  8


1 Step (curb) (QC):  4 (CGA )


4 Steps (QC):  4 (CGA )


12 Steps (QC):  9 (Pt has 3 stairs to enter/exit the home. )





Balance


Picking up an Object (QC):  4 (CGA )





ADL-Treatment


Eating (QC):  6


Oral Hygiene (QC):  5


Shower/Bathe Self (QC):  7 (declined at this time)


Upper Body Dressing (QC):  4


Lower Body Dressing (QC):  4


On/Off Footwear (QC):  4 (use of long handle shoe horn)


Toileting Hygiene (QC):  4





Assessment/Plan


Assessment and Plan


Assess & Plan/Chief Complaint


CHF myopathy


Atrial fibrillation with RVR


S/P MVR


Chronic HFpEF


HTN


HLD


T2DM


COPD


OMEGA


Depression





Plan:


Meds transferred from ICU


Monitor INR


Monitor HR


Appreciate Dr Nolasco





7/23/2023:


Supportive care


Monitor closely





7/24/2023:


Monitor INR


Supportive care





7/25/2023:


Monitor closely





(1) Myopathy











AUGUST LOUIS DO                Jul 25, 2023 05:41

## 2023-07-26 VITALS — SYSTOLIC BLOOD PRESSURE: 109 MMHG | DIASTOLIC BLOOD PRESSURE: 57 MMHG

## 2023-07-26 VITALS — SYSTOLIC BLOOD PRESSURE: 113 MMHG | DIASTOLIC BLOOD PRESSURE: 58 MMHG

## 2023-07-26 LAB
INR PPP: 3 (ref 0.8–1.4)
PROTHROMBIN TIME: 31 SEC (ref 12.2–14.7)

## 2023-07-26 RX ADMIN — AMIODARONE HYDROCHLORIDE SCH MG: 200 TABLET ORAL at 09:07

## 2023-07-26 RX ADMIN — PANTOPRAZOLE SCH MG: 20 TABLET, DELAYED RELEASE ORAL at 09:07

## 2023-07-26 RX ADMIN — DOCUSATE SODIUM SCH MG: 100 CAPSULE ORAL at 20:23

## 2023-07-26 RX ADMIN — INSULIN ASPART SCH UNIT: 100 INJECTION, SOLUTION INTRAVENOUS; SUBCUTANEOUS at 05:52

## 2023-07-26 RX ADMIN — POLYETHYLENE GLYCOL (3350) SCH GM: 17 POWDER, FOR SOLUTION ORAL at 19:22

## 2023-07-26 RX ADMIN — BUMETANIDE SCH MG: 1 TABLET ORAL at 17:00

## 2023-07-26 RX ADMIN — POTASSIUM CHLORIDE SCH MEQ: 750 TABLET, FILM COATED, EXTENDED RELEASE ORAL at 20:19

## 2023-07-26 RX ADMIN — POLYETHYLENE GLYCOL (3350) SCH GM: 17 POWDER, FOR SOLUTION ORAL at 09:15

## 2023-07-26 RX ADMIN — DOCUSATE SODIUM AND SENNOSIDES SCH EA: 8.6; 5 TABLET, FILM COATED ORAL at 20:23

## 2023-07-26 RX ADMIN — ASPIRIN SCH MG: 81 TABLET ORAL at 09:07

## 2023-07-26 RX ADMIN — POTASSIUM CHLORIDE SCH MEQ: 750 TABLET, FILM COATED, EXTENDED RELEASE ORAL at 09:07

## 2023-07-26 RX ADMIN — PREDNISOLONE SCH ML: 15 SOLUTION ORAL at 20:24

## 2023-07-26 RX ADMIN — WARFARIN SODIUM SCH MG: 4 TABLET ORAL at 17:01

## 2023-07-26 RX ADMIN — INSULIN ASPART SCH UNIT: 100 INJECTION, SOLUTION INTRAVENOUS; SUBCUTANEOUS at 20:23

## 2023-07-26 RX ADMIN — FLUTICASONE FUROATE AND VILANTEROL TRIFENATATE SCH EACH: 100; 25 POWDER RESPIRATORY (INHALATION) at 07:10

## 2023-07-26 RX ADMIN — PREDNISOLONE SCH ML: 15 SOLUTION ORAL at 13:00

## 2023-07-26 RX ADMIN — PREDNISOLONE SCH ML: 15 SOLUTION ORAL at 17:02

## 2023-07-26 RX ADMIN — DOCUSATE SODIUM SCH MG: 100 CAPSULE ORAL at 09:07

## 2023-07-26 RX ADMIN — DILTIAZEM HYDROCHLORIDE SCH MG: 120 CAPSULE, COATED, EXTENDED RELEASE ORAL at 09:07

## 2023-07-26 RX ADMIN — INSULIN ASPART SCH UNIT: 100 INJECTION, SOLUTION INTRAVENOUS; SUBCUTANEOUS at 16:13

## 2023-07-26 RX ADMIN — PREDNISOLONE SCH ML: 15 SOLUTION ORAL at 09:09

## 2023-07-26 RX ADMIN — INSULIN ASPART SCH UNIT: 100 INJECTION, SOLUTION INTRAVENOUS; SUBCUTANEOUS at 11:20

## 2023-07-26 RX ADMIN — CARVEDILOL SCH MG: 25 TABLET, FILM COATED ORAL at 09:09

## 2023-07-26 RX ADMIN — WARFARIN SODIUM SCH MG: 2.5 TABLET ORAL at 17:01

## 2023-07-26 RX ADMIN — FLUTICASONE FUROATE AND VILANTEROL TRIFENATATE SCH EACH: 100; 25 POWDER RESPIRATORY (INHALATION) at 09:08

## 2023-07-26 RX ADMIN — CARVEDILOL SCH EA: 25 TABLET, FILM COATED ORAL at 09:09

## 2023-07-26 RX ADMIN — BUMETANIDE SCH MG: 1 TABLET ORAL at 06:39

## 2023-07-26 RX ADMIN — DOCUSATE SODIUM AND SENNOSIDES SCH EA: 8.6; 5 TABLET, FILM COATED ORAL at 09:07

## 2023-07-26 NOTE — OCCUPATIONAL THER DAILY NOTE
OT Current Status-Daily Note


Subjective


Pt alert, sitting in recliner.  Co-treat with PT([id number]) for family training,

skills of 2 clinicians required for education and safety with family member to 

assure a continuum of care and understanding on pt's progress at [facility] and 

continuing to home.  PT focusing on ambulation, mobility and transfers while OT 

focusing on ADL education, functional mobility and daily activity education.





Mental Status/Objective


Patient Orientation:  Person, Place, Time, Situation


Attachments:  Telemetry





ADL-Treatment


Therapy Code Descriptions/Definitions 





Functional [place] Measure:


0=Not Assessed/NA        4=Minimal Assistance


1=Total Assistance        5=Supervision or Setup


2=Maximal Assistance  6=Modified [place]


3=Moderate Assistance 7=Complete IndependenceSCALE: Activities may be completed 

with or without assistive devices.





6-Indepedent-patient completes the activity by him/herself with no assistance 

from a helper.


5-Set-up or Clean-up Assistance-helper sets up or cleans up; patient completes 

activity. [place] assists only prior to or  


    following the activity.


4-Supervision or Touching Assistance-helper provides verbal cues and/or 

touching/steadying and/or contact guard assistance as patient completes 

activity. Assistance may be provided   


    throughout the activity or intermittently.


3-Partial/Moderate Assistance-helper does LESS THAN HALF the effort. [place] 

lifts, holds or supports trunk or limbs, but provides less than half the effort.


2-Substantial/Maximal Assistance-helper does MORE THAN HALF the effort. [place] 

lifts or holds trunk or limbs and provides more than half the effort.


8-Honrbmonc-cuezjh does ALL the effort. Patient does none of the effort to 

complete the activity. Or, the assistance of 2 or more helpers is required for 

the patient to complete the  


    activity.


If activity was not attempted, code reason:


7-Patient Refused.


9-Not Applicable-not attempted and the patient did not perform the activity be

fore the current illness, exacerbation or injury.


10-Not Attempted due to Environmental Limitations-(lack of equipment, weather 

restraints, etc.).


88-Not Attempted due to Medical Conditions or Safety Concerns.





Other Treatment


Pt is very independent and has tendencies to complete tasks her own way.  

Education for pt for safety though pt continues to only do things her way.  Pt 

ambulates long distance using 4WW, will leave it behind when ambulating around 

room.  Pt demonstrated independence with safety concerns when stepping in/out of

tub using small rail.  Pt then completed functional mobility independent with s

afety concerns.  Pt has B UE strengthening HEP though will only complete a few 

times then stops.  Discussed and used wedge to lay down on then using bedcane to

complete bed mobility.  Daughter called and looking for items to use.  After 

session, pt sitting in recliner with call light/phone in reach.  All needs met 

in room.





OT Short Term Goals


Short Term Goals


Time Frame:  2023


Toileting hygiene:  5


Shower/bathe self:  5


Upper body dressin


Lower body dressin





OT Long Term Goals


Long Term Goals


Acute change in mental status:  0


Inattention:  0


Disorganized thinkin


Altered level of consciousness:  0


Oral Hygiene (QC):  6


Toileting Hygiene (QC):  6


Shower/Bathe Self (QC):  6


Upper Body Dressing (QC):  6


Lower Body Dressing (QC):  6


On/Off Footwear (QC):  6


1=Demonstrate adherence to instructed precautions during ADL tasks.


2=Patient will verbalize/demonstrate understanding of assistive 

devices/modifications for ADL.


3=Patient will improve strength/tolerance for activity to enable patient to 

perform ADL's.





OT Education/Plan


Problem List/Assessment


Assessment:  Decreased Activ Tolerance, Decreased UE Strength, Impaired Funct 

Balance





Discharge Recommendations


Plan/Recommendations:  Continue POC





Treatment Plan/Plan of Care


Patient would benefit from OT for education, treatment and training to promote 

independence in ADL's, mobility, safety and/or upper extremity function for 

ADL's.


Plan of Care:  ADL Retraining, Concurrent Therapy, Functional Mobility, Group 

Exercise/Act as Ind, UE Funct Exercise/Act


Treatment Duration:  2023


Frequency:  At least 5 of 7 days/Wk (IRF)


Estimated Hrs Per Day:  1.5 hours per day


Agreement:  Yes


Rehab Potential:  Good





Time


Start Time:  08:00


Stop Time:  09:30


DATE:  2023


Total Time Billed (hr/min):  90


Billed Treatment Time


1 visit-FA 6 (90 min)  co-treat with PT 90 min











GELACIO MOLINA               2023 09:30

## 2023-07-26 NOTE — PM&R PROGRESS NOTE
Subjective


HPI/CC On Admission


Date Seen by Provider:  Jul 26, 2023


Time Seen by Provider:  14:00


Subjective/Events-last exam


7/26/2023:


Patient doing much better


Discharge plan for Friday


We will send all meds in early


Home O2 evaluation will be initiated








7/25/2023:


Doing well


Increased ambulation


No falls


Labs stable








7/24/2023:


Doing well


Monitor closely


Lungs are clear


No pain


Reviewed meds








7/23/2023:


Doing well


Anxious about "further complications" and I tired to reassure


Patient has significant mental illness giving rise to anxiety and despondency 

and tearfulness





Review of Systems


General:  Fatigue, Malaise





Objective


Exam


Vital Signs





Vital Signs








  Date Time  Temp Pulse Resp B/P (MAP) Pulse Ox O2 Delivery O2 Flow Rate FiO2


 


7/27/23 00:53  84      


 


7/26/23 20:31      Nasal Cannula 2.00 


 


7/26/23 20:15 36.0  18 113/58 (76) 97   





Capillary Refill :


General Appearance:  No Apparent Distress, WD/WN, Chronically ill, Obese


HEENT:  PERRL/EOMI, Normal ENT Inspection, Pharynx Normal


Neck:  Full Range of Motion, Normal Inspection, Non Tender, Supple, Carotid 

Bruit


Respiratory:  Chest Non Tender, Lungs Clear, Normal Breath Sounds, No Accessory 

Muscle Use, No Respiratory Distress


Cardiovascular:  Regular Rate, Rhythm, No Edema, No Gallop, No JVD, No Murmur, 

Normal Peripheral Pulses


Gastrointestinal:  Normal Bowel Sounds, No Organomegaly, No Pulsatile Mass, Non 

Tender, Soft


Back:  Normal Inspection, No CVA Tenderness, No Vertebral Tenderness


Extremity:  Normal Capillary Refill, Normal Inspection, Normal Range of Motion, 

Non Tender, No Calf Tenderness, No Pedal Edema


Neurologic/Psychiatric:  Alert, Oriented x3, CNs II-XII Norm as Tested, Abnormal

Gait, Depressed Affect, Motor Weakness (generalized)


Skin:  Normal Color, Warm/Dry


Lymphatic:  No Adenopathy





Results/Procedures


Lab


Patient resulted labs reviewed.





FIM


Transfers


Therapy Code Descriptions/Definitions 





Functional [place] Measure:


0=Not Assessed/NA        4=Minimal Assistance


1=Total Assistance        5=Supervision or Setup


2=Maximal Assistance  6=Modified [place]


3=Moderate Assistance 7=Complete IndependenceSCALE: Activities may be completed 

with or without assistive devices.





6-Indepedent-patient completes the activity by him/herself with no assistance 

from a helper.


5-Set-up or Clean-up Assistance-helper sets up or cleans up; patient completes 

activity. [place] assists only prior to or  


    following the activity.


4-Supervision or Touching Assistance-helper provides verbal cues and/or 

touching/steadying and/or contact guard assistance as patient completes 

activity. Assistance may be provided   


    throughout the activity or intermittently.


3-Partial/Moderate Assistance-helper does LESS THAN HALF the effort. [place] 

lifts, holds or supports trunk or limbs, but provides less than half the effort.


2-Substantial/Maximal Assistance-helper does MORE THAN HALF the effort. [place] 

lifts or holds trunk or limbs and provides more than half the effort.


2-Eiiimgmot-sgcnbg does ALL the effort. Patient does none of the effort to 

complete the activity. Or, the assistance of 2 or more helpers is required for 

the patient to complete the  


    activity.


If activity was not attempted, code reason:


7-Patient Refused.


9-Not Applicable-not attempted and the patient did not perform the activity 

before the current illness, exacerbation or injury.


10-Not Attempted due to Environmental Limitations-(lack of equipment, weather 

restraints, etc.).


88-Not Attempted due to Medical Conditions or Safety Concerns.


Roll Left to Right (QC):  6


Sit to Lying (QC):  6


Sit to Stand (QC):  6


Chair/Bed-to-Chair Xfer(QC):  6


Car Transfer (QC):  6





Gait Training


Does the Patient Walk?:  Yes


Distance:  151', 256'


Walk 10 feet (QC):  6


Walk 50 ft with 2 Turns(QC):  6


Walk 150 ft (QC):  6


Walking 10ft/uneven surface-QC:  4 (CGA )


Gait Assistive Device:  Walker 4 Wheeled





Wheelchair Training


Does the Pt Use a Wheelchair?:  No


Wheel 50 ft with 2 turns (QC):  9


Wheel 150 ft (QC):  9


Type of Wheelchair:  N/A





Stair Training


#of Steps:  8


1 Step (curb) (QC):  6


4 Steps (QC):  6


12 Steps (QC):  9





Balance


Picking up an Object (QC):  4 (CGA )





ADL-Treatment


Eating (QC):  6


Oral Hygiene (QC):  5


Shower/Bathe Self (QC):  7 (declined at this time)


Upper Body Dressing (QC):  4


Lower Body Dressing (QC):  4


On/Off Footwear (QC):  4 (use of long handle shoe horn)


Toileting Hygiene (QC):  4





Assessment/Plan


Assessment and Plan


Assess & Plan/Chief Complaint


CHF myopathy


Atrial fibrillation with RVR


S/P MVR


Chronic HFpEF


HTN


HLD


T2DM


COPD


OMEGA


Depression





Plan:


Meds transferred from ICU


Monitor INR


Monitor HR


Appreciate Dr Nolasco





7/23/2023:


Supportive care


Monitor closely





7/24/2023:


Monitor INR


Supportive care





7/25/2023:


Monitor closely





7/26/2023:


Discharge home Friday





(1) Myopathy











AUGUST LOUIS DO                Jul 26, 2023 12:34

## 2023-07-26 NOTE — CARDIOLOGY PROGRESS NOTE
Subjective


Date Seen by Provider:  Jul 26, 2023


Time Seen by Provider:  08:15


Subjective/Events-last exam


Patient is with PT, no new complaints. Denies any chest pain





Objective-Cardiology


Exam


Last Set of Vital Signs





Vital Signs








 7/23/23 7/26/23 7/26/23 7/26/23





 08:28 07:30 09:44 12:56


 


Temp  35.8  


 


Pulse    97


 


Resp  18  


 


B/P (MAP)  109/57 (74)  


 


Pulse Ox   98 


 


O2 Delivery   Room Air 


 


O2 Flow Rate 2.00   








I&O











Intake and Output 


 


 7/26/23





 00:00


 


Intake Total 1200 ml


 


Balance 1200 ml


 


 


 


Intake Oral 1200 ml


 


# Voids 6


 


# Bowel Movements 1








General:  Alert, Oriented X3, Cooperative


HEENT:  Atraumatic, PERRLA


Lungs:  Clear to Auscultation, Normal Air Movement


Heart:  Normal S1, Normal S2


Abdomen:  Normal Bowel Sounds, Soft


Skin:  No Rashes, No Significant Lesion


Neuro:  Normal Speech


Psych/Mental Status:  Mental Status NL, Mood NL





A/P-Cardiology


Admission Diagnosis


Mitral stenosis, s/p Mitral valve replacement


afib


HTN


DM





Assessment/Plan


Paroxysmal atrial fibrillation, Converted to sinus rhythm.  Maintained on 

Cardizem  mg daily, Amiodarone 200 mg daily.  Continue warfarin.  


Patient does not want to travel to [place] or [place]. INR 3.0. Will have 

her f/u in our office





Mitral valve stenosis, history of MVR with mechanical valve with Dr. Mina at 

 approx 2 weeks ago. Incision and CT sites C/D/I without erythema or drainage.







HTN, controlled, continue to monitor blood pressure





HLP, continue to monitor as outpatient





DM, management per primary team





Generalized debility/weakness, continue PT/OT





Supervisory-Addendum Brief


Supervisory Addendum


Participated in pt care:  history, MDM, physical


Personally performed:  exam, history, MDM


Care discussed with:  PA


Results interpretation:  Verified all documentation


Notes:


Patient was seen and evaluated with Tara, examination performed, management 

plan was discussed, agree with the current scribed note, I made few changes to 

the note using Italic font


Patient was seen at bedside, laying down comfortably


Feeling well.  No new complaint


We discussed the management plan, educated on keeping INR 2-3


We will arrange for follow-up as an outpatient











TARA LOVE  Jul 26, 2023 09:56


VY CORDOVA MD              Jul 26, 2023 15:35

## 2023-07-26 NOTE — PHYSICAL THERAPY DAILY NOTE
PT Daily Note-Current


Subjective


PT on toilet upon arrival in the room. pt good for therapy. pt states pain is a 

4/10 in chest area. Pt.OT co treat [id number] with daughter for skill of 2 

clinicians requires to address and concerns pt or daughter has about returning 

home safely. Daughter was shown pt current  level of function and and safety to 

challenged they may face upon returning home. OT addressing daily ADL's and pt 

focusing transfers and ambulation. No concern for moving forward with going home

after family training with either the daughter or the pt. pt was left with 

daughter in common area with pt tech walking with her back to her room when 

ready.





Pain


Section J - Health Conditions


1. Rarely or not at all


2. Occasionally


3. Frequently


4. Almost constantly


8. Unable to answer


Pain Effect on Sleep:  2


Pain Interference with Therapy:  2


Pain Interference w/Day-to-Day:  2





Mental Status


Patient Orientation:  Person, Place, Time, Situation





Transfers


SCALE: Activities may be completed with or without assistive devices.





6-Indepedent-patient completes the activity by him/herself with no assistance 

from a helper.


5-Set-up or Clean-up Assistance-helper sets up or cleans up; patient completes 

activity. [place] assists only prior to or  


    following the activity.


4-Supervision or Touching Assistance-helper provides verbal cues and/or 

touching/steadying and/or contact guard assistance as patient completes 

activity. Assistance may be provided   


    throughout the activity or intermittently.


3-Partial/Moderate Assistance-helper does LESS THAN HALF the effort. [place] 

lifts, holds or supports trunk or limbs, but provides less than half the effort.


2-Substantial/Maximal Assistance-helper does MORE THAN HALF the effort. [place] 

lifts or holds trunk or limbs and provides more than half the effort.


4-Yppcfhdiv-oqzvct does ALL the effort. Patient does none of the effort to 

complete the activity. Or, the assistance of 2 or more helpers is required for 

the patient to complete the  


    activity.


If activity was not attempted, code reason:


7-Patient Refused.


9-Not Applicable-not attempted and the patient did not perform the activity 

before the current illness, exacerbation or injury.


10-Not Attempted due to Environmental Limitations-(lack of equipment, weather 

restraints, etc.).


88-Not Attempted due to Medical Conditions or Safety Concerns.


Roll Left & Right (QC):  6


Sit to Lying (QC):  6


Lying to Sitting/Side of Bed(Q:  6


Sit to Stand (QC):  6


Chair/Bed-to-Chair Xfer(QC):  6


Toilet Transfer (QC):  6


Car Transfer (QC):  6





Weight Bearing


Right Lower Extremity:  Right


Full Weight Bearing


Left Lower Extremity:  Left


Full Weight Bearing





Gait Training


Walk 10 feet (QC):  6


Walk 50 ft with 2 Turns(QC):  6


Walk 150 ft (QC):  6


Gait Assistive Device:  Walker 4 Wheeled





Wheelchair Training


Wheel 50 ft with 2 turns (QC):  9


Wheel 150 ft (QC):  9





Stair Training


1 Step (curb) (QC):  6


4 Steps (QC):  6


12 Steps (QC):  9





Treatments


Pt was able to ambulate 319 ft with 4WW and one standing rest break for aprox 45

sec. pt is able preform nu-step for 10 mins at level 1 with no rest breaks 

showing increased stamina and endurance. pt is able to preform 8 stairs and a 

car transfers independently as well as a tub transfers with tub chair. pt is 

able to preform bed mobility with bed Kaplan and a wedge.





Assessment


Current Status:  Good Progress





PT Long Term Goals


Long Term Goals


PT Long Term Goals Time Frame:  Jul 29, 2023


Roll Left & Right (QC):  6 (Pt will be Mod I with functional mobility with the 

4WW, in order to be at PLOF and safely return home, alone. )


Sit to Lying (QC):  6 (Pt will be Mod I with functional mobility with the 4WW, 

in order to be at PLOF and safely return home, alone. )


Lying-Sitting on Side/Bed(QC):  6 (Pt will be Mod I with functional mobility 

with the 4WW, in order to be at PLOF and safely return home, alone. )


Sit to Stand (QC):  6 (Pt will be Mod I with functional mobility with the 4WW, 

in order to be at PLOF and safely return home, alone. )


Chair/Bed-to-Chair Xfer(QC):  6 (Pt will be Mod I with functional mobility with 

the 4WW, in order to be at PLOF and safely return home, alone. )


Toilet Transfer (QC):  6 (Pt will be Mod I with functional mobility with the 

4WW, in order to be at PLOF and safely return home, alone. )


Car Transfer (QC):  6 (Pt will be Mod I with functional mobility with the 4WW, 

in order to be at PLOF and safely return home, alone. )


Does the Patient Walk:  Yes


Walk 10 feet (QC):  6 (Pt will be Mod I with functional mobility with the 4WW, 

in order to be at PLOF and safely return home, alone. )


Walk 50ft with 2 Turns (QC):  6 (Pt will be Mod I with functional mobility with 

the 4WW, in order to be at PLOF and safely return home, alone. )


Walk 150 ft (QC):  6 (Pt will be Mod I with functional mobility with the 4WW, in

order to be at PLOF and safely return home, alone. )


Walking 10ft on Uneven Surface:  6 (Pt will be Mod I with functional mobility 

with the 4WW, in order to be at PLOF and safely return home, alone. )


1 Step (curb) (QC):  6 (Pt will be Mod I with functional mobility with the 4WW, 

in order to be at PLOF and safely return home, alone. )


4 Steps (QC):  6 (Pt will be Mod I with functional mobility with the 4WW, in 

order to be at PLOF and safely return home, alone. )


12 Steps (QC):  9 (Pt has 3 steps to enter/exit home )


Picking up an Object (QC):  6 (Pt will be Mod I with functional mobility with 

the 4WW, in order to be at PLOF and safely return home, alone. )


Does the Pt use WC or Scooter?:  No


Wheel 50 feet with 2 turns (QC:  9


Type:  N/A


Wheel 150 feet:  9


Type:  N/A





PT Plan


Treatment/Plan


Treatment Plan:  Continue Plan of Care


Treatment Plan:  Bed Mobility, Concurrent Therapy, Education, Functional 

Activity Lazaro, Functional Strength, Group Therapy, Gait, Safety, Therapeutic 

Exercise, Transfers


Treatment Duration:  Jul 29, 2023


Frequency:  At least 5 of 7 days/Wk (IRF)


Estimated Hrs Per Day:  1.5 hours per day


Patient and/or Family Agrees t:  Yes





Time


Time In:  0800


Time Out:  0930


DATE:  Jul 26, 2023


Total Billed Treatment Time:  90


Total Billed Treatment


1


 FA x 4


EX


GT


Co treat with OT for 90 mins











Karin Monge PTA              Jul 26, 2023 11:29

## 2023-07-27 VITALS — SYSTOLIC BLOOD PRESSURE: 92 MMHG | DIASTOLIC BLOOD PRESSURE: 51 MMHG

## 2023-07-27 VITALS — SYSTOLIC BLOOD PRESSURE: 118 MMHG | DIASTOLIC BLOOD PRESSURE: 62 MMHG

## 2023-07-27 LAB
INR PPP: 3.3 (ref 0.8–1.4)
PROTHROMBIN TIME: 32.8 SEC (ref 12.2–14.7)

## 2023-07-27 RX ADMIN — POTASSIUM CHLORIDE SCH MEQ: 750 TABLET, FILM COATED, EXTENDED RELEASE ORAL at 20:51

## 2023-07-27 RX ADMIN — PREDNISOLONE SCH ML: 15 SOLUTION ORAL at 17:07

## 2023-07-27 RX ADMIN — POLYETHYLENE GLYCOL (3350) SCH GM: 17 POWDER, FOR SOLUTION ORAL at 20:52

## 2023-07-27 RX ADMIN — INSULIN ASPART SCH UNIT: 100 INJECTION, SOLUTION INTRAVENOUS; SUBCUTANEOUS at 11:22

## 2023-07-27 RX ADMIN — PREDNISOLONE SCH ML: 15 SOLUTION ORAL at 13:28

## 2023-07-27 RX ADMIN — PANTOPRAZOLE SCH MG: 20 TABLET, DELAYED RELEASE ORAL at 08:08

## 2023-07-27 RX ADMIN — CARVEDILOL SCH EA: 25 TABLET, FILM COATED ORAL at 08:09

## 2023-07-27 RX ADMIN — ASPIRIN SCH MG: 81 TABLET ORAL at 08:08

## 2023-07-27 RX ADMIN — DOCUSATE SODIUM AND SENNOSIDES SCH EA: 8.6; 5 TABLET, FILM COATED ORAL at 08:08

## 2023-07-27 RX ADMIN — AMIODARONE HYDROCHLORIDE SCH MG: 200 TABLET ORAL at 08:08

## 2023-07-27 RX ADMIN — INSULIN ASPART SCH UNIT: 100 INJECTION, SOLUTION INTRAVENOUS; SUBCUTANEOUS at 06:30

## 2023-07-27 RX ADMIN — DOCUSATE SODIUM SCH MG: 100 CAPSULE ORAL at 20:51

## 2023-07-27 RX ADMIN — POLYETHYLENE GLYCOL (3350) SCH GM: 17 POWDER, FOR SOLUTION ORAL at 08:12

## 2023-07-27 RX ADMIN — PREDNISOLONE SCH ML: 15 SOLUTION ORAL at 08:09

## 2023-07-27 RX ADMIN — DOCUSATE SODIUM SCH MG: 100 CAPSULE ORAL at 08:08

## 2023-07-27 RX ADMIN — BUMETANIDE SCH MG: 1 TABLET ORAL at 17:07

## 2023-07-27 RX ADMIN — POTASSIUM CHLORIDE SCH MEQ: 750 TABLET, FILM COATED, EXTENDED RELEASE ORAL at 08:08

## 2023-07-27 RX ADMIN — INSULIN ASPART SCH UNIT: 100 INJECTION, SOLUTION INTRAVENOUS; SUBCUTANEOUS at 16:06

## 2023-07-27 RX ADMIN — PREDNISOLONE SCH ML: 15 SOLUTION ORAL at 21:08

## 2023-07-27 RX ADMIN — FLUTICASONE FUROATE AND VILANTEROL TRIFENATATE SCH EACH: 100; 25 POWDER RESPIRATORY (INHALATION) at 07:06

## 2023-07-27 RX ADMIN — DILTIAZEM HYDROCHLORIDE SCH MG: 120 CAPSULE, COATED, EXTENDED RELEASE ORAL at 08:08

## 2023-07-27 RX ADMIN — BUMETANIDE SCH MG: 1 TABLET ORAL at 06:30

## 2023-07-27 RX ADMIN — CARVEDILOL SCH MG: 25 TABLET, FILM COATED ORAL at 08:09

## 2023-07-27 RX ADMIN — DOCUSATE SODIUM AND SENNOSIDES SCH EA: 8.6; 5 TABLET, FILM COATED ORAL at 20:52

## 2023-07-27 RX ADMIN — INSULIN ASPART SCH UNIT: 100 INJECTION, SOLUTION INTRAVENOUS; SUBCUTANEOUS at 21:08

## 2023-07-27 NOTE — PHYSICAL THERAPY DAILY NOTE
PT Daily Note-Current


Subjective


PT in recliner upon arrival and willing for therapy. pt reports 6/10 pain and 

nursing was notified and pain med was given to combat pain. Pt was left in room 

sitting in recliner after therapy with call light and all needs met.





Pain


Section J - Health Conditions


1. Rarely or not at all


2. Occasionally


3. Frequently


4. Almost constantly


8. Unable to answer


Pain Effect on Sleep:  2


Pain Interference with Therapy:  2


Pain Interference w/Day-to-Day:  2





Transfers


SCALE: Activities may be completed with or without assistive devices.





6-Indepedent-patient completes the activity by him/herself with no assistance 

from a helper.


5-Set-up or Clean-up Assistance-helper sets up or cleans up; patient completes 

activity. [place] assists only prior to or  


    following the activity.


4-Supervision or Touching Assistance-helper provides verbal cues and/or 

touching/steadying and/or contact guard assistance as patient completes 

activity. Assistance may be provided   


    throughout the activity or intermittently.


3-Partial/Moderate Assistance-helper does LESS THAN HALF the effort. [place] 

lifts, holds or supports trunk or limbs, but provides less than half the effort.


2-Substantial/Maximal Assistance-helper does MORE THAN HALF the effort. [place] 

lifts or holds trunk or limbs and provides more than half the effort.


9-Npexdalux-uublzo does ALL the effort. Patient does none of the effort to 

complete the activity. Or, the assistance of 2 or more helpers is required for 

the patient to complete the  


    activity.


If activity was not attempted, code reason:


7-Patient Refused.


9-Not Applicable-not attempted and the patient did not perform the activity 

before the current illness, exacerbation or injury.


10-Not Attempted due to Environmental Limitations-(lack of equipment, weather 

restraints, etc.).


88-Not Attempted due to Medical Conditions or Safety Concerns.


Roll Left & Right (QC):  6


Sit to Lying (QC):  6


Lying to Sitting/Side of Bed(Q:  6


Sit to Stand (QC):  6


Chair/Bed-to-Chair Xfer(QC):  6


Toilet Transfer (QC):  6


Car Transfer (QC):  6





Weight Bearing


Right Lower Extremity:  Right


Full Weight Bearing


Left Lower Extremity:  Left


Full Weight Bearing





Gait Training


Walk 10 feet (QC):  6


Walk 50 ft with 2 Turns(QC):  6


Walk 150 ft (QC):  6


Walking 10ft/uneven surface-QC:  6


Gait Assistive Device:  Walker 4 Wheeled





Wheelchair Training


Wheel 50 ft with 2 turns (QC):  9


Wheel 150 ft (QC):  9





Stair Training


#of Steps:  8


1 Step (curb) (QC):  6


4 Steps (QC):  6


12 Steps (QC):  9





Balance


Picking up an Object (QC):  6





Exercises


NuStep Minutes:  6


 NuStep Workload:  1





Treatments


PT was able to preform all Qc's independently. Bed mobility, uneven surfaces, 

ambulation, car and toilet transfer etc with  4ww. PT does require rest in 

between each activity. secondary to fatigue and low endurance. With rest pt is 

able to resume activity. Pt can ambulate 397 ft with 4WW and one standing rest 

break.





Assessment


Current Status:  Good Progress





PT Long Term Goals


Long Term Goals


PT Long Term Goals Time Frame:  Jul 29, 2023


Roll Left & Right (QC):  6 (Pt will be Mod I with functional mobility with the 4

WW, in order to be at PLOF and safely return home, alone. )


Sit to Lying (QC):  6 (Pt will be Mod I with functional mobility with the 4WW, 

in order to be at PLOF and safely return home, alone. )


Lying-Sitting on Side/Bed(QC):  6 (Pt will be Mod I with functional mobility 

with the 4WW, in order to be at PLOF and safely return home, alone. )


Sit to Stand (QC):  6 (Pt will be Mod I with functional mobility with the 4WW, 

in order to be at PLOF and safely return home, alone. )


Chair/Bed-to-Chair Xfer(QC):  6 (Pt will be Mod I with functional mobility with 

the 4WW, in order to be at PLOF and safely return home, alone. )


Toilet Transfer (QC):  6 (Pt will be Mod I with functional mobility with the 

4WW, in order to be at PLOF and safely return home, alone. )


Car Transfer (QC):  6 (Pt will be Mod I with functional mobility with the 4WW, 

in order to be at PLOF and safely return home, alone. )


Does the Patient Walk:  Yes


Walk 10 feet (QC):  6 (Pt will be Mod I with functional mobility with the 4WW, 

in order to be at PLOF and safely return home, alone. )


Walk 50ft with 2 Turns (QC):  6 (Pt will be Mod I with functional mobility with 

the 4WW, in order to be at PLOF and safely return home, alone. )


Walk 150 ft (QC):  6 (Pt will be Mod I with functional mobility with the 4WW, in

order to be at PLOF and safely return home, alone. )


Walking 10ft on Uneven Surface:  6 (Pt will be Mod I with functional mobility 

with the 4WW, in order to be at PLOF and safely return home, alone. )


1 Step (curb) (QC):  6 (Pt will be Mod I with functional mobility with the 4WW, 

in order to be at PLOF and safely return home, alone. )


4 Steps (QC):  6 (Pt will be Mod I with functional mobility with the 4WW, in 

order to be at PLOF and safely return home, alone. )


12 Steps (QC):  9 (Pt has 3 steps to enter/exit home )


Picking up an Object (QC):  6 (Pt will be Mod I with functional mobility with 

the 4WW, in order to be at PLOF and safely return home, alone. )


Does the Pt use WC or Scooter?:  No


Wheel 50 feet with 2 turns (QC:  9


Type:  N/A


Wheel 150 feet:  9


Type:  N/A





PT Plan


Treatment/Plan


Treatment Plan:  Continue Plan of Care


Treatment Plan:  Bed Mobility, Concurrent Therapy, Education, Functional Activi

ty Lazaro, Functional Strength, Group Therapy, Gait, Safety, Therapeutic 

Exercise, Transfers


Treatment Duration:  Jul 29, 2023


Frequency:  At least 5 of 7 days/Wk (IRF)


Estimated Hrs Per Day:  1.5 hours per day


Patient and/or Family Agrees t:  Yes





Time


Time In:  0800


Time Out:  0930


DATE:  Jul 27, 2023


Total Billed Treatment Time:  90


Total Billed Treatment


1


FA x 4


GT x 2











Karin Monge PTA              Jul 27, 2023 09:40

## 2023-07-27 NOTE — CARDIOLOGY PROGRESS NOTE
Subjective


Date Seen by Provider:  Jul 27, 2023


Time Seen by Provider:  08:10


Subjective/Events-last exam


Patient is with PT, no new complaints. Denies any chest pain or dyspnea.





Objective-Cardiology


Exam


Last Set of Vital Signs





Vital Signs








 7/26/23 7/27/23 7/27/23





 20:31 07:41 09:36


 


Temp  36.0 


 


Pulse  99 


 


Resp  20 


 


B/P (MAP)  118/62 (80) 


 


Pulse Ox   93


 


O2 Delivery   Room Air


 


O2 Flow Rate 2.00  








I&O











Intake and Output 


 


 7/27/23





 00:00


 


Intake Total 2614 ml


 


Balance 2614 ml


 


 


 


Intake Oral 2614 ml


 


# Voids 6


 


# Bowel Movements 1








General:  Alert, Oriented X3, Cooperative


HEENT:  Atraumatic, PERRLA


Lungs:  Clear to Auscultation, Normal Air Movement


Heart:  Normal S1, Normal S2


Abdomen:  Normal Bowel Sounds, Soft


Skin:  No Rashes, No Significant Lesion


Neuro:  Normal Speech


Psych/Mental Status:  Mental Status NL, Mood NL





A/P-Cardiology


Admission Diagnosis


Mitral stenosis, s/p Mitral valve replacement


afib


HTN


DM





Assessment/Plan


Paroxysmal atrial fibrillation, Converted to sinus rhythm.  Maintained on 

Cardizem  mg daily, Amiodarone 200 mg daily.  Continue warfarin.  


Patient does not want to travel to [place] or [place]. INR 3.0. Will have 

her f/u in our office





Mitral valve stenosis, history of MVR with mechanical valve with Dr. Mina at 

 approx 2 weeks ago. Incision and CT sites C/D/I without erythema or drainage.







HTN, controlled, continue to monitor blood pressure





HLP, continue to monitor as outpatient





DM, management per primary team





Generalized debility/weakness, continue PT/OT





Ok for discharge from cardiology standpoint. Follow up in our office in 4 weeks.





Supervisory-Addendum Brief


Supervisory Addendum


Participated in pt care:  history, MDM, physical


Personally performed:  exam, history, MDM


Care discussed with:  PA


Results interpretation:  Verified all documentation


Notes:


Patient was seen and evaluated with Tara, examination performed, management 

plan was discussed, agree with the current scribed note, I made few changes to 

the note using Italic font


Patient was seen and evaluated with Tara, receiving physical therapy


Feeling better.


No new complaint


INR 3.3


Continue on current medication and monitor











TARA LOVE  Jul 27, 2023 09:04


VY CORDOVA MD              Jul 27, 2023 15:42

## 2023-07-27 NOTE — D/C HH FACE TO FACE ORDER
D/C  Face to Face Orders


Reconcile Patient Problems


Problems Reviewed?:  Yes





Instructions for Patient


HH


Patient Instructions/FollowUp:  


PCP 1 week


Physician to follow Patient:  PCP


Discharge Diet for Home:  ADA Diet


Patient Problems:  


Open heart surgery





Patient Data-Allergies,Ht & Wt


Patient Allergies:  


Coded Allergies:  


     No Allergy Information Available (Unverified , 3/3/22)





Home Health Need/Face to Face


Date of Face to Face:  Jul 27, 2023


Clinical Findings:  Generalized weakness and fatigue, Instability, Muscle 

weakness


I have seen Pt face-to-face:  Yes


Discharged To:  Home


Diagnosis/Conditions:  


Debility


Patient is Homebound due to:  [place] fall risk due to instabilty, Muscle weakness


Homebound Status


   Due to the above stated illness, injury or surgical procedure (medical 

condition or diagnosis) and associated clinical findings, the patient is 

homebound because of his/her inability to leave home except with aid of a 

supportive device and/or person AND leaving the home requires a considerable and

taxing effort or is medically contraindicated.


Pt req the following assistanc:  Walker





Home Health Nursing Orders


Home Health Services Order:  Nursing Services, Occupational Ther-Evaluate & 

Treat, Physical Therapy-Evaluate & Treat


Certify Stmt


I certify that this patient is under my care and that I, a nurse practitioner or

a physician; a assistant working with me, had a face to face encounter that -

meets the physician face to face encounter requirements with this patient as 

dated.











AUGUST LOUIS DO                Jul 27, 2023 05:36

## 2023-07-27 NOTE — OCCUPATIONAL THER DAILY NOTE
OT Current Status-Daily Note


Subjective


Pt alert, sitting on toilet.  Pt agrees to therapy.  No c/o pain at beginning of

session, at end of session pt c/o 7/10 pain nrsg brought pain meds.





Mental Status/Objective


Patient Orientation:  Person, Place, Time, Situation





ADL-Treatment


Using 4WW, pt is independent with gathering supplies. Independent with 

toileting.  Independently with shower using shower bench, grabbar and hand held 

shower.  Independent with UBD, LBD and footwear.  Pt will have bed cane, wedge 

pillow and long handle shoe horn at home.  Independent with eating.  Independent

standing at sink to complete oral care and grooming.  After session, pt lying in

bed with call light/phone in reach.  All needs met in room.


Therapy Code Descriptions/Definitions 





Functional [place] Measure:


0=Not Assessed/NA        4=Minimal Assistance


1=Total Assistance        5=Supervision or Setup


2=Maximal Assistance  6=Modified [place]


3=Moderate Assistance 7=Complete IndependenceSCALE: Activities may be completed 

with or without assistive devices.





6-Indepedent-patient completes the activity by him/herself with no assistance 

from a helper.


5-Set-up or Clean-up Assistance-helper sets up or cleans up; patient completes 

activity. [place] assists only prior to or  


    following the activity.


4-Supervision or Touching Assistance-helper provides verbal cues and/or 

touching/steadying and/or contact guard assistance as patient completes 

activity. Assistance may be provided   


    throughout the activity or intermittently.


3-Partial/Moderate Assistance-helper does LESS THAN HALF the effort. [place] 

lifts, holds or supports trunk or limbs, but provides less than half the effort.


2-Substantial/Maximal Assistance-helper does MORE THAN HALF the effort. [place] 

lifts or holds trunk or limbs and provides more than half the effort.


1-Ednqkplgm-rtgdcf does ALL the effort. Patient does none of the effort to 

complete the activity. Or, the assistance of 2 or more helpers is required for 

the patient to complete the  


    activity.


If activity was not attempted, code reason:


7-Patient Refused.


9-Not Applicable-not attempted and the patient did not perform the activity 

before the current illness, exacerbation or injury.


10-Not Attempted due to Environmental Limitations-(lack of equipment, weather 

restraints, etc.).


88-Not Attempted due to Medical Conditions or Safety Concerns.


Eating (QC):  6


Oral Hygiene (QC):  6


Shower/Bathe Self (QC):  6


Upper Body Dressing (QC):  6


Lower Body Dressing (QC):  6


On/Off Footwear:  6


Toileting Hygiene (QC):  6


Toilet Transfer (QC):  6


Pt takes increased time to complete all tasks for recovery breaks due to fatigue

and pain.  Nrsg aware.





BIMS CAM


BIMS


Expression of Ideas and Wants:  Without Difficulty


Understanding Verbal Content:  Understands


Brief Interview/Mental Status:  Yes


IRF RICKIE BIMS:  








IRF RICKIE BIMS Response (Comments) Value


 


Repitition of Three Words Three 3


 


Recalls Socks Yes, No Cue Required 2


 


Recalls Blue Yes, No Cue Required 2


 


Recalls Bed Yes, No Cue Required 2


 


Year Correct 3


 


Month Accurate Within 5 Days 2


 


Day Correct 1


 


Total  15








Patient Normally Able to Recal:  Current Session, Location of own room, Staff 

Names and faces, That he/she in a hsp


Should Staff Asses. Mental St.:  No





CAM


Mental Status Change/Baseline:  0


Inattention:  0


Disorganized thinkin


Altered level of consciousness:  0





OT Short Term Goals


Short Term Goals


Time Frame:  2023


Toileting hygiene:  5


Shower/bathe self:  5


Upper body dressin


Lower body dressin





OT Long Term Goals


Long Term Goals


Time Frame:  2023


Acute change in mental status:  0


Inattention:  0


Disorganized thinkin


Altered level of consciousness:  0


Oral Hygiene (QC):  6 (met)


Toileting Hygiene (QC):  6 (met)


Shower/Bathe Self (QC):  6 (met)


Upper Body Dressing (QC):  6 (met)


Lower Body Dressing (QC):  6 (met)


On/Off Footwear (QC):  6 (met)


1=Demonstrate adherence to instructed precautions during ADL tasks.


2=Patient will verbalize/demonstrate understanding of assistive 

devices/modifications for ADL.


3=Patient will improve strength/tolerance for activity to enable patient to 

perform ADL's.





OT Education/Plan


Problem List/Assessment


Assessment:  Decreased Activ Tolerance, Decreased UE Strength, Impaired Self-

Care Skills





Discharge Recommendations


Plan/Recommendations:  Continue POC





Treatment Plan/Plan of Care


Patient would benefit from OT for education, treatment and training to promote 

independence in ADL's, mobility, safety and/or upper extremity function for 

ADL's.


Plan of Care:  ADL Retraining, Concurrent Therapy, Functional Mobility, Group 

Exercise/Act as Ind, UE Funct Exercise/Act


Treatment Duration:  2023


Frequency:  At least 5 of 7 days/Wk (IRF)


Estimated Hrs Per Day:  1.5 hours per day


Agreement:  Yes


Rehab Potential:  Good





Time


Start Time:  10:30


Stop Time:  12:00


DATE:  2023


Total Time Billed (hr/min):  90


Billed Treatment Time


1 visit-ADL











GELACIO MOLINA               2023 11:06

## 2023-07-27 NOTE — PM&R PROGRESS NOTE
Subjective


HPI/CC On Admission


Date Seen by Provider:  Jul 27, 2023


Time Seen by Provider:  10:30


Subjective/Events-last exam


7/27/2023:


DC planned for tomorrow


Hold Coumadin today and restart Friday night


INR ordered daily per Dr Santamaria








7/26/2023:


Patient doing much better


Discharge plan for Friday


We will send all meds in early


Home O2 evaluation will be initiated








7/25/2023:


Doing well


Increased ambulation


No falls


Labs stable








7/24/2023:


Doing well


Monitor closely


Lungs are clear


No pain


Reviewed meds








7/23/2023:


Doing well


Anxious about "further complications" and I tired to reassure


Patient has significant mental illness giving rise to anxiety and despondency 

and tearfulness





Review of Systems


General:  Fatigue, Malaise





Objective


Exam


Vital Signs





Vital Signs








  Date Time  Temp Pulse Resp B/P (MAP) Pulse Ox O2 Delivery O2 Flow Rate FiO2


 


7/27/23 21:47     93 Room Air  


 


7/27/23 20:39 36.3 96 20 92/51 (65)    


 


7/26/23 20:31       2.00 





Capillary Refill :


General Appearance:  No Apparent Distress, WD/WN, Chronically ill, Obese


HEENT:  PERRL/EOMI, Normal ENT Inspection, Pharynx Normal


Neck:  Full Range of Motion, Normal Inspection, Non Tender, Supple, Carotid 

Bruit


Respiratory:  Chest Non Tender, Lungs Clear, Normal Breath Sounds, No Accessory 

Muscle Use, No Respiratory Distress


Cardiovascular:  Regular Rate, Rhythm, No Edema, No Gallop, No JVD, No Murmur, 

Normal Peripheral Pulses


Gastrointestinal:  Normal Bowel Sounds, No Organomegaly, No Pulsatile Mass, Non 

Tender, Soft


Back:  Normal Inspection, No CVA Tenderness, No Vertebral Tenderness


Extremity:  Normal Capillary Refill, Normal Inspection, Normal Range of Motion, 

Non Tender, No Calf Tenderness, No Pedal Edema


Neurologic/Psychiatric:  Alert, Oriented x3, CNs II-XII Norm as Tested, Abnormal

Gait, Depressed Affect, Motor Weakness (generalized)


Skin:  Normal Color, Warm/Dry


Lymphatic:  No Adenopathy





Results/Procedures


Lab


Patient resulted labs reviewed.





FIM


Transfers


Therapy Code Descriptions/Definitions 





Functional [place] Measure:


0=Not Assessed/NA        4=Minimal Assistance


1=Total Assistance        5=Supervision or Setup


2=Maximal Assistance  6=Modified [place]


3=Moderate Assistance 7=Complete IndependenceSCALE: Activities may be completed 

with or without assistive devices.





6-Indepedent-patient completes the activity by him/herself with no assistance 

from a helper.


5-Set-up or Clean-up Assistance-helper sets up or cleans up; patient completes 

activity. [place] assists only prior to or  


    following the activity.


4-Supervision or Touching Assistance-helper provides verbal cues and/or 

touching/steadying and/or contact guard assistance as patient completes 

activity. Assistance may be provided   


    throughout the activity or intermittently.


3-Partial/Moderate Assistance-helper does LESS THAN HALF the effort. [place] 

lifts, holds or supports trunk or limbs, but provides less than half the effort.


2-Substantial/Maximal Assistance-helper does MORE THAN HALF the effort. [place] 

lifts or holds trunk or limbs and provides more than half the effort.


2-Mgxbbykws-chzhus does ALL the effort. Patient does none of the effort to 

complete the activity. Or, the assistance of 2 or more helpers is required for 

the patient to complete the  


    activity.


If activity was not attempted, code reason:


7-Patient Refused.


9-Not Applicable-not attempted and the patient did not perform the activity 

before the current illness, exacerbation or injury.


10-Not Attempted due to Environmental Limitations-(lack of equipment, weather 

restraints, etc.).


88-Not Attempted due to Medical Conditions or Safety Concerns.


Roll Left to Right (QC):  6


Sit to Lying (QC):  6


Sit to Stand (QC):  6


Chair/Bed-to-Chair Xfer(QC):  6


Car Transfer (QC):  6





Gait Training


Does the Patient Walk?:  Yes


Distance:  151', 256'


Walk 10 feet (QC):  6


Walk 50 ft with 2 Turns(QC):  6


Walk 150 ft (QC):  6


Walking 10ft/uneven surface-QC:  6


Gait Assistive Device:  Walker 4 Wheeled





Wheelchair Training


Does the Pt Use a Wheelchair?:  No


Wheel 50 ft with 2 turns (QC):  9


Wheel 150 ft (QC):  9


Type of Wheelchair:  N/A





Stair Training


#of Steps:  8


1 Step (curb) (QC):  6


4 Steps (QC):  6


12 Steps (QC):  9





Balance


Picking up an Object (QC):  6





ADL-Treatment


Eating (QC):  6


Oral Hygiene (QC):  5


Shower/Bathe Self (QC):  7 (declined at this time)


Upper Body Dressing (QC):  4


Lower Body Dressing (QC):  4


On/Off Footwear (QC):  4 (use of long handle shoe horn)


Toileting Hygiene (QC):  4





Assessment/Plan


Assessment and Plan


Assess & Plan/Chief Complaint


CHF myopathy


Atrial fibrillation with RVR


S/P MVR


Chronic HFpEF


HTN


HLD


T2DM


COPD


OMEGA


Depression


Warfarin for anticoagulation





Plan:


Meds transferred from ICU


Monitor INR


Monitor HR


Appreciate Dr Nolasco





7/23/2023:


Supportive care


Monitor closely





7/24/2023:


Monitor INR


Supportive care





7/25/2023:


Monitor closely





7/26/2023:


Discharge home Friday 7/27/2023:


INR tomorrow


Hold Coumadin tonight only





(1) Myopathy











AUGUST LOUIS DO                Jul 27, 2023 10:32

## 2023-07-28 VITALS — SYSTOLIC BLOOD PRESSURE: 123 MMHG | DIASTOLIC BLOOD PRESSURE: 58 MMHG

## 2023-07-28 LAB
INR PPP: 3 (ref 0.8–1.4)
PROTHROMBIN TIME: 31 SEC (ref 12.2–14.7)

## 2023-07-28 RX ADMIN — BUMETANIDE SCH MG: 1 TABLET ORAL at 07:13

## 2023-07-28 RX ADMIN — INSULIN ASPART SCH UNIT: 100 INJECTION, SOLUTION INTRAVENOUS; SUBCUTANEOUS at 05:51

## 2023-07-28 RX ADMIN — FLUTICASONE FUROATE AND VILANTEROL TRIFENATATE SCH EACH: 100; 25 POWDER RESPIRATORY (INHALATION) at 08:17

## 2023-07-28 RX ADMIN — POLYETHYLENE GLYCOL (3350) SCH GM: 17 POWDER, FOR SOLUTION ORAL at 08:21

## 2023-07-28 RX ADMIN — AMIODARONE HYDROCHLORIDE SCH MG: 200 TABLET ORAL at 08:17

## 2023-07-28 RX ADMIN — DILTIAZEM HYDROCHLORIDE SCH MG: 120 CAPSULE, COATED, EXTENDED RELEASE ORAL at 08:17

## 2023-07-28 RX ADMIN — CARVEDILOL SCH MG: 25 TABLET, FILM COATED ORAL at 08:18

## 2023-07-28 RX ADMIN — ASPIRIN SCH MG: 81 TABLET ORAL at 08:17

## 2023-07-28 RX ADMIN — POTASSIUM CHLORIDE SCH MEQ: 750 TABLET, FILM COATED, EXTENDED RELEASE ORAL at 08:17

## 2023-07-28 RX ADMIN — DOCUSATE SODIUM SCH MG: 100 CAPSULE ORAL at 08:20

## 2023-07-28 RX ADMIN — PANTOPRAZOLE SCH MG: 20 TABLET, DELAYED RELEASE ORAL at 08:17

## 2023-07-28 RX ADMIN — CARVEDILOL SCH EA: 25 TABLET, FILM COATED ORAL at 08:18

## 2023-07-28 RX ADMIN — DOCUSATE SODIUM AND SENNOSIDES SCH EA: 8.6; 5 TABLET, FILM COATED ORAL at 08:21

## 2023-07-28 RX ADMIN — PREDNISOLONE SCH ML: 15 SOLUTION ORAL at 08:21

## 2023-07-28 NOTE — DISCHARGE SUMMARY
Diagnosis/Chief Complaint


Date of Admission


Jul 22, 2023 at 13:35


Date of Discharge





Discharge Date:  Jul 28, 2023


Discharge Diagnosis


CHF myopathy


Atrial fibrillation with RVR


S/P MVR


Chronic HFpEF


HTN


HLD


T2DM


COPD


OMEGA


Depression


Warfarin for anticoagulation





Plan:


Meds transferred from ICU


Monitor INR


Monitor HR


Appreciate Dr Nolasco





7/23/2023:


Supportive care


Monitor closely





7/24/2023:


Monitor INR


Supportive care





7/25/2023:


Monitor closely





7/26/2023:


Discharge home Friday 7/27/2023:


INR tomorrow


Hold Coumadin tonight only





Discharge Summary


Discharge Physical Examination


Allergies:  


Coded Allergies:  


     No Allergy Information Available (Unverified , 3/3/22)


Vitals & I&Os





Vital Signs








  Date Time  Temp Pulse Resp B/P (MAP) Pulse Ox O2 Delivery O2 Flow Rate FiO2


 


7/28/23 10:25        


 


7/28/23 07:50 35.9 90 18  95 Room Air  


 


7/26/23 20:31       2.00 








General Appearance:  Alert, Oriented X3, Cooperative


Respiratory:  Clear to Auscultation


Cardiovascular:  Regular Rate


Psych/Mental Status:  Mental Status NL





Hospital Course


Was the Problem List Reviewed?:  Yes


Uneventful course after she was transferred from ARU to ICU 2nd day on ARU for 

AF RVR then was readmitted and had converted to NSR. Patient was discouraged but

she was able to regain nearly baseline function after intense therapy and INR 

was therapeutic at time of DC and she was ready for home. Home O2 at night 

ordered and she needs sleep study.


Labs (last 24 hrs)


Laboratory Tests


7/22/23 15:42: Glucometer 108


7/23/23 05:40: 


White Blood Count 14.1H, Red Blood Count 3.36L, Hemoglobin 8.8L, Hematocrit 30L,

Mean Corpuscular Volume 90, Mean Corpuscular Hemoglobin 26, Mean Corpuscular 

Hemoglobin Concent 29L, Red Cell Distribution Width 26.9H, Platelet Count 300, 

Mean Platelet Volume 8.8L, Immature Granulocyte % (Auto) 1, Neutrophils (%) 

(Auto) 73, Lymphocytes (%) (Auto) 13, Monocytes (%) (Auto) 7, Eosinophils (%) 

(Auto) 5, Basophils (%) (Auto) 1, Neutrophils # (Auto) 10.3H, Lymphocytes # 

(Auto) 1.8, Monocytes # (Auto) 1.0, Eosinophils # (Auto) 0.7H, Basophils # 

(Auto) 0.2H, Immature Granulocyte # (Auto) 0.1, Neutrophils % (Manual) 73, 

Lymphocytes % (Manual) 18, Monocytes % (Manual) 5, Eosinophils % (Manual) 4, 

Hypochromasia SLIGHT, Anisocytosis SLIGHT, Prothrombin Time 29.3H, INR Comment 

2.8H, Sodium Level 136, Potassium Level 4.7, Chloride Level 100, Carbon Dioxide 

Level 26, Anion Gap 10, Blood Urea Nitrogen 28H, Creatinine 0.91, Estimat 

Glomerular Filtration Rate 71, BUN/Creatinine Ratio 31, Glucose Level 103, 

Calcium Level 9.2, Corrected Calcium 9.8, Total Bilirubin 0.6, Aspartate Amino 

Transf (AST/SGOT) 29, Alanine Aminotransferase (ALT/SGPT) 18, Alkaline 

Phosphatase 78, Total Protein 6.5, Albumin 3.3


7/23/23 12:12: Glucometer 101


7/23/23 12:13: Glucometer 104


7/23/23 17:10: Glucometer 105


7/23/23 20:55: Glucometer 92


7/24/23 05:35: Glucometer 101


7/24/23 12:21: Glucometer 111H


7/24/23 17:01: Glucometer 99


7/24/23 21:43: Glucometer 125H


7/25/23 06:23: 


White Blood Count 11.7H, Red Blood Count 3.40L, Hemoglobin 8.9L, Hematocrit 31L,

Mean Corpuscular Volume 90, Mean Corpuscular Hemoglobin 26, Mean Corpuscular 

Hemoglobin Concent 29L, Red Cell Distribution Width 27.0H, Platelet Count 273, 

Mean Platelet Volume 8.8L, Immature Granulocyte % (Auto) 0, Neutrophils (%) 

(Auto) 69, Lymphocytes (%) (Auto) 15, Monocytes (%) (Auto) 10, Eosinophils (%) 

(Auto) 6, Basophils (%) (Auto) 1, Neutrophils # (Auto) 8.0H, Lymphocytes # 

(Auto) 1.7, Monocytes # (Auto) 1.1H, Eosinophils # (Auto) 0.6H, Basophils # 

(Auto) 0.1, Immature Granulocyte # (Auto) 0.1, Prothrombin Time 32.3H, INR 

Comment 3.2H, Sodium Level 136, Potassium Level 4.6, Chloride Level 98, Carbon 

Dioxide Level 28, Anion Gap 10, Blood Urea Nitrogen 21H, Creatinine 0.91, 

Estimat Glomerular Filtration Rate 71, BUN/Creatinine Ratio 23, Glucose Level 

106H, Calcium Level 9.3, Corrected Calcium 9.9, Total Bilirubin 0.6, Aspartate 

Amino Transf (AST/SGOT) 32, Alanine Aminotransferase (ALT/SGPT) 19, Alkaline 

Phosphatase 79, Total Protein 6.4, Albumin 3.3


7/25/23 11:35: Glucometer 100


7/25/23 15:48: Glucometer 103


7/25/23 21:09: Glucometer 117H


7/26/23 05:08: Glucometer 95


7/26/23 06:10: 


Prothrombin Time 31.0H, INR Comment 3.0H


7/26/23 11:16: Glucometer 85


7/26/23 16:08: Glucometer 100


7/26/23 20:22: Glucometer 124H


7/27/23 05:55: 


Prothrombin Time 32.8H, INR Comment 3.3H


7/27/23 05:57: Glucometer 92


7/27/23 11:20: Glucometer 116H


7/27/23 16:02: Glucometer 111H


7/27/23 20:43: Glucometer 100


7/28/23 05:36: 


Prothrombin Time 31.0H, INR Comment 3.0H


7/28/23 05:45: Glucometer 90





Pending Labs


Laboratory Tests


7/22/23 15:42: Glucometer 108


7/23/23 05:40: 


White Blood Count 14.1, Red Blood Count 3.36, Hemoglobin 8.8, Hematocrit 30, 

Mean Corpuscular Volume 90, Mean Corpuscular Hemoglobin 26, Mean Corpuscular 

Hemoglobin Concent 29, Red Cell Distribution Width 26.9, Platelet Count 300, 

Mean Platelet Volume 8.8, Immature Granulocyte % (Auto) 1, Neutrophils (%) 

(Auto) 73, Lymphocytes (%) (Auto) 13, Monocytes (%) (Auto) 7, Eosinophils (%) 

(Auto) 5, Basophils (%) (Auto) 1, Neutrophils # (Auto) 10.3, Lymphocytes # 

(Auto) 1.8, Monocytes # (Auto) 1.0, Eosinophils # (Auto) 0.7, Basophils # (Auto)

0.2, Immature Granulocyte # (Auto) 0.1, Neutrophils % (Manual) 73, Lymphocytes %

(Manual) 18, Monocytes % (Manual) 5, Eosinophils % (Manual) 4, Hypochromasia 

SLIGHT, Anisocytosis SLIGHT, Prothrombin Time 29.3, INR Comment 2.8, Sodium 

Level 136, Potassium Level 4.7, Chloride Level 100, Carbon Dioxide Level 26, 

Anion Gap 10, Blood Urea Nitrogen 28, Creatinine 0.91, Estimat Glomerular 

Filtration Rate 71, BUN/Creatinine Ratio 31, Glucose Level 103, Calcium Level 

9.2, Corrected Calcium 9.8, Total Bilirubin 0.6, Aspartate Amino Transf 

(AST/SGOT) 29, Alanine Aminotransferase (ALT/SGPT) 18, Alkaline Phosphatase 78, 

Total Protein 6.5, Albumin 3.3


7/23/23 12:12: Glucometer 101


7/23/23 12:13: Glucometer 104


7/23/23 17:10: Glucometer 105


7/23/23 20:55: Glucometer 92


7/24/23 05:35: Glucometer 101


7/24/23 12:21: Glucometer 111


7/24/23 17:01: Glucometer 99


7/24/23 21:43: Glucometer 125


7/25/23 06:23: 


White Blood Count 11.7, Red Blood Count 3.40, Hemoglobin 8.9, Hematocrit 31, 

Mean Corpuscular Volume 90, Mean Corpuscular Hemoglobin 26, Mean Corpuscular 

Hemoglobin Concent 29, Red Cell Distribution Width 27.0, Platelet Count 273, 

Mean Platelet Volume 8.8, Immature Granulocyte % (Auto) 0, Neutrophils (%) 

(Auto) 69, Lymphocytes (%) (Auto) 15, Monocytes (%) (Auto) 10, Eosinophils (%) 

(Auto) 6, Basophils (%) (Auto) 1, Neutrophils # (Auto) 8.0, Lymphocytes # (Auto)

1.7, Monocytes # (Auto) 1.1, Eosinophils # (Auto) 0.6, Basophils # (Auto) 0.1, 

Immature Granulocyte # (Auto) 0.1, Prothrombin Time 32.3, INR Comment 3.2, 

Sodium Level 136, Potassium Level 4.6, Chloride Level 98, Carbon Dioxide Level 

28, Anion Gap 10, Blood Urea Nitrogen 21, Creatinine 0.91, Estimat Glomerular 

Filtration Rate 71, BUN/Creatinine Ratio 23, Glucose Level 106, Calcium Level 

9.3, Corrected Calcium 9.9, Total Bilirubin 0.6, Aspartate Amino Transf 

(AST/SGOT) 32, Alanine Aminotransferase (ALT/SGPT) 19, Alkaline Phosphatase 79, 

Total Protein 6.4, Albumin 3.3


7/25/23 11:35: Glucometer 100


7/25/23 15:48: Glucometer 103


7/25/23 21:09: Glucometer 117


7/26/23 05:08: Glucometer 95


7/26/23 06:10: 


Prothrombin Time 31.0, INR Comment 3.0


7/26/23 11:16: Glucometer 85


7/26/23 16:08: Glucometer 100


7/26/23 20:22: Glucometer 124


7/27/23 05:55: 


Prothrombin Time 32.8, INR Comment 3.3


7/27/23 05:57: Glucometer 92


7/27/23 11:20: Glucometer 116


7/27/23 16:02: Glucometer 111


7/27/23 20:43: Glucometer 100


7/28/23 05:36: 


Prothrombin Time 31.0, INR Comment 3.0


7/28/23 05:45: Glucometer 90








Discharge


Home Medications:





Active Scripts


Active


Tramadol HCl 50 Mg Tablet 50 Mg PO Q6HR PRN


Diltiazem 24Hr ER (Diltiazem HCl) 120 Mg Cap.er.24h 120 Mg PO DAILY


Amiodarone HCl 200 Mg Tablet 200 Mg PO DAILY


Warfarin Sodium 2.5 Mg Tablet 2.5 Mg PO DAILY@1800


Warfarin Sodium 4 Mg Tablet 4 Mg PO DAILY@1800 30 Days


Bumetanide 2 Mg Tablet 2 Mg PO BID


Atorvastatin Calcium 20 Mg Tablet 20 Mg PO HS


Aspirin EC (Aspirin) 81 Mg Tablet.dr 81 Mg PO DAILY


K-Tab ER (Potassium Chloride) 10 Meq Tablet.er 10 Meq PO BID


Percocet 5-325 mg Tablet (Oxycodone HCl/Acetaminophen) 1 Each Tablet 1-2 Tab PO 

Q6H PRN MDD 6 TABS


Reported


Invokamet Xr 150-1,000 mg Tab (Canagliflozin/Metformin HCl) 150 Mg-1,000 Mg 

Tab.bp.24h 1 Ea PO DAILY


Viibryd (Vilazodone Hydrochloride) 40 Mg Tablet 40 Mg PO DAILY


Omeprazole 20 Mg Tab.rap.dr 20 Mg PO DAILY


     TAKE BEFORE BREAKFAST


Trulicity (Dulaglutide) 0.75 Mg/0.5 Ml Pen.injctr 0.75 Mg SQ WEEK


Symbicort 80-4.5 Mcg Inhaler (Budesonide/Formoterol Fumarate) 80 Mcg-4.5 

Mcg/Actuation Hfa.aer.ad 2 Puff IH BID


Vitamin C (Ascorbic Acid) 1,000 Mg Tablet 1,000 Mg PO DAILY


Ventolin Hfa (Albuterol Sulfate) 90 Mcg Hfa.aer.ad 1-2 Puff INH TID PRN


Senna-S Tablet (Sennosides/Docusate Sodium) 8.6 Mg-50 Mg Tablet 2 Each PO BID


Tylenol Extra Strength (Acetaminophen) 500 Mg Tablet 1,000 Mg PO Q6H





Instructions to patient/family


Please see electronic discharge instructions given to patient.





Diagnosis/Problems


Diagnosis/Problems





(1) Myopathy











AUGUST LOUIS DO                Jul 28, 2023 05:04

## 2023-07-28 NOTE — THERAPY TEAM DISCHARGE SUMMARY
Therapy Discharge Summary


Discharge Recommendations


Date of Discharge


2023 at 09:50


Therapy D/C Recommendations:  Home w/ Family Support, Other, See Comments 

(cardiac rehab )





Physical Therapy


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 

proximal weakness; Admitted to ICU on 2023 for A-Fib with RVR; Readmitted 

to ARU on 2023. At [facility], pt was Ind with no AD and driving. Pt just 

recently started using a 4WW, secondary to B LE and hip weakness. Pt works for 

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  Pt has a 4WW,

SC, and BSC. Upon PT eval, pt was CGA for all aspects of functional mobility 

with the 4WW. PT focused on B LE strength, endurance, walking, safety/balance, 

functional mobility, and Ind. Pt progressed well with PT and and met all set 

goals. Pt d/c from ARU on 2023 to home with family assistance and cardiac 

rehab. D/C from PT at this time.


Roll Left to Right (QC):  6


Sit to Lying (QC):  6


Lying to Sitting/Side of Bed(Q:  6


Sit to Stand (QC):  6


Chair/Bed-to-Chair Xfer(QC):  6


Toilet Transfer (QC):  6


Car Transfer (QC):  6


Does the Patient Walk:  Yes


Mode of Locomotion:  Walk


Anticipated Mode of Locomotion:  Walk


Walk 10 feet (QC):  6


Walk 50 ft with 2 Turns(QC):  6


Walk 150 ft (QC):  6


Walking 10ft on uneven surface:  6


Gait Assistive Device:  Walker 4 Wheeled


Does the Pt Use a Wheelchair:  No


Wheel 50 ft with 2 turns (QC):  9


Wheel 150 ft (QC):  9


Type of Wheelchair:  N/A


#of Steps:  8


1 Step (curb) (QC):  6


4 Steps (QC):  6


12 Steps (QC):  9


Walking Assistive Device:  Walker


Balance Sitting Static:  Normal


Balance Sitting Dynamic:  Normal


Balance-Standing Static:  Good


Picking up an Object (QC):  6





Occupational Therapy


Decreased Activ Tolerance, Decreased UE Strength, Impaired Self-Care Skills


Eating (QC):  6


Oral Hygiene (QC):  6


Shower/Bathe Self (QC):  6


Upper Body Dressing (QC):  6


Lower Body Dressing (QC):  6


On/Off Footwear (QC):  6


Toileting Hygiene (QC):  6





PT Long Term Goals


Long Term Goals


PT Long Term Goals Time Frame:  2023


Roll Left to Right (QC):  6 (Pt will be Mod I with functional mobility with the 

4WW, in order to be at PLOF and safely return home, alone. )


Sit to Lying (QC):  6 (Pt will be Mod I with functional mobility with the 4WW, 

in order to be at PLOF and safely return home, alone. )


Lying-Sitting on Side/Bed(QC):  6 (Pt will be Mod I with functional mobility 

with the 4WW, in order to be at PLOF and safely return home, alone. )


Sit to Stand (QC):  6 (Pt will be Mod I with functional mobility with the 4WW, 

in order to be at PLOF and safely return home, alone. )


Chair/Bed-to-Chair Xfer(QC):  6 (Pt will be Mod I with functional mobility with 

the 4WW, in order to be at PLOF and safely return home, alone. )


Toilet/Commode Transfer (QC):  6 (Pt will be Mod I with functional mobility with

the 4WW, in order to be at PLOF and safely return home, alone. )


Car Transfer (QC):  6 (Pt will be Mod I with functional mobility with the 4WW, 

in order to be at PLOF and safely return home, alone. )


Does the Patient Walk:  Yes


Walk 10 feet (QC):  6 (Pt will be Mod I with functional mobility with the 4WW, 

in order to be at PLOF and safely return home, alone. )


Walk 10ft-Uneven Surface(QC):  6 (Pt will be Mod I with functional mobility with

the 4WW, in order to be at PLOF and safely return home, alone. )


Walk 50ft with 2 Turns (QC):  6 (Pt will be Mod I with functional mobility with 

the 4WW, in order to be at PLOF and safely return home, alone. )


Walk 150 ft (QC):  6 (Pt will be Mod I with functional mobility with the 4WW, in

order to be at PLOF and safely return home, alone. )


Does the Pt use WC or Scooter?:  No


Wheel 50 feet with 2 turns (QC:  9


Type:  N/A


Wheel 150 feet:  9


Type:  N/A


1 Step (curb) (QC):  6 (Pt will be Mod I with functional mobility with the 4WW, 

in order to be at PLOF and safely return home, alone. )


4 Steps (QC):  6 (Pt will be Mod I with functional mobility with the 4WW, in 

order to be at PLOF and safely return home, alone. )


12 Steps (QC):  9 (Pt has 3 steps to enter/exit home )


Picking up an Object (QC):  6 (Pt will be Mod I with functional mobility with 

the 4WW, in order to be at PLOF and safely return home, alone. )





OT Long Term Goals


Long Term Goals


Time Frame:  2023


Acute change in mental status:  0


Inattention:  0


Disorganized thinkin


Altered level of consciousness:  0


Oral Hygiene (QC):  6 (met)


Toileting Hygiene (QC):  6 (met)


Shower/Bathe Self (QC):  6 (met)


Upper Body Dressing (QC):  6 (met)


Lower Body Dressing (QC):  6 (met)


On/Off Footwear (QC):  6 (met)


1=Demonstrate adherence to instructed precautions during ADL tasks.


2=Patient will verbalize/demonstrate understanding of assistive 

devices/modifications for ADL.


3=Patient will improve strength/tolerance for activity to enable patient to 

perform ADL's.





Speech Long Term Goals


Long Term Goals


1. Pt to complete medication and money management with 100% accuracy 

independently.


2. Tolerate least restrictive diet consistency with no s/sx of aspiration.











BAKARI RAMIREZ PT                2023 15:21

## 2023-07-31 NOTE — THERAPY TEAM DISCHARGE SUMMARY
Therapy Discharge Summary


Discharge Recommendations


Date of Discharge


2023 at 09:50


Therapy D/C Recommendations:  Home w/ Family Support, Other, See Comments 

(cardiac rehab )





Physical Therapy


Roll Left to Right (QC):  6


Sit to Lying (QC):  6


Lying to Sitting/Side of Bed(Q:  6


Sit to Stand (QC):  6


Chair/Bed-to-Chair Xfer(QC):  6


Toilet Transfer (QC):  6


Car Transfer (QC):  6


Does the Patient Walk:  Yes


Mode of Locomotion:  Walk


Anticipated Mode of Locomotion:  Walk


Walk 10 feet (QC):  6


Walk 50 ft with 2 Turns(QC):  6


Walk 150 ft (QC):  6


Walking 10ft on uneven surface:  6


Gait Assistive Device:  Walker 4 Wheeled


Does the Pt Use a Wheelchair:  No


Wheel 50 ft with 2 turns (QC):  9


Wheel 150 ft (QC):  9


Type of Wheelchair:  N/A


#of Steps:  8


1 Step (curb) (QC):  6


4 Steps (QC):  6


12 Steps (QC):  9


Walking Assistive Device:  Walker


Balance Sitting Static:  Normal


Balance Sitting Dynamic:  Normal


Balance-Standing Static:  Good


Picking up an Object (QC):  6





Occupational Therapy


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 

proximal weakness; Admitted to ICU on 2023 for A-Fib with RVR; Readmitted 

to ARU on 2023. At [facility], pt was Ind with no AD and driving. Pt just rece

ntly started using a 4WW, secondary to B LE and hip weakness. 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  Pt has a 4WW, SC, 

and BSC.. Pt progressed well with OT and and met all set goals. Pt d/c from ARU 

on 2023 to home with family assistance and cardiac rehab. D/C from OT at 

this time.


Decreased Activ Tolerance, Decreased UE Strength, Impaired Self-Care Skills


Eating (QC):  6


Oral Hygiene (QC):  6


Shower/Bathe Self (QC):  6


Upper Body Dressing (QC):  6


Lower Body Dressing (QC):  6


On/Off Footwear (QC):  6


Toileting Hygiene (QC):  6





PT Long Term Goals


Long Term Goals


PT Long Term Goals Time Frame:  2023


Roll Left to Right (QC):  6 (Pt will be Mod I with functional mobility with the 

4WW, in order to be at PLOF and safely return home, alone. )


Sit to Lying (QC):  6 (Pt will be Mod I with functional mobility with the 4WW, 

in order to be at PLOF and safely return home, alone. )


Lying-Sitting on Side/Bed(QC):  6 (Pt will be Mod I with functional mobility wi

th the 4WW, in order to be at PLOF and safely return home, alone. )


Sit to Stand (QC):  6 (Pt will be Mod I with functional mobility with the 4WW, 

in order to be at PLOF and safely return home, alone. )


Chair/Bed-to-Chair Xfer(QC):  6 (Pt will be Mod I with functional mobility with 

the 4WW, in order to be at PLOF and safely return home, alone. )


Toilet/Commode Transfer (QC):  6 (Pt will be Mod I with functional mobility with

the 4WW, in order to be at PLOF and safely return home, alone. )


Car Transfer (QC):  6 (Pt will be Mod I with functional mobility with the 4WW, 

in order to be at PLOF and safely return home, alone. )


Does the Patient Walk:  Yes


Walk 10 feet (QC):  6 (Pt will be Mod I with functional mobility with the 4WW, 

in order to be at PLOF and safely return home, alone. )


Walk 10ft-Uneven Surface(QC):  6 (Pt will be Mod I with functional mobility with

the 4WW, in order to be at PLOF and safely return home, alone. )


Walk 50ft with 2 Turns (QC):  6 (Pt will be Mod I with functional mobility with 

the 4WW, in order to be at PLOF and safely return home, alone. )


Walk 150 ft (QC):  6 (Pt will be Mod I with functional mobility with the 4WW, in

order to be at PLOF and safely return home, alone. )


Does the Pt use WC or Scooter?:  No


Wheel 50 feet with 2 turns (QC:  9


Type:  N/A


Wheel 150 feet:  9


Type:  N/A


1 Step (curb) (QC):  6 (Pt will be Mod I with functional mobility with the 4WW, 

in order to be at PLOF and safely return home, alone. )


4 Steps (QC):  6 (Pt will be Mod I with functional mobility with the 4WW, in 

order to be at PLOF and safely return home, alone. )


12 Steps (QC):  9 (Pt has 3 steps to enter/exit home )


Picking up an Object (QC):  6 (Pt will be Mod I with functional mobility with 

the 4WW, in order to be at PLOF and safely return home, alone. )





OT Long Term Goals


Long Term Goals


Time Frame:  2023


Acute change in mental status:  0


Inattention:  0


Disorganized thinkin


Altered level of consciousness:  0


Oral Hygiene (QC):  6 (met)


Toileting Hygiene (QC):  6 (met)


Shower/Bathe Self (QC):  6 (met)


Upper Body Dressing (QC):  6 (met)


Lower Body Dressing (QC):  6 (met)


On/Off Footwear (QC):  6 (met)


1=Demonstrate adherence to instructed precautions during ADL tasks.


2=Patient will verbalize/demonstrate understanding of assistive 

devices/modifications for ADL.


3=Patient will improve strength/tolerance for activity to enable patient to 

perform ADL's.





Speech Long Term Goals


Long Term Goals


1. Pt to complete medication and money management with 100% accuracy independent

ly.


2. Tolerate least restrictive diet consistency with no s/sx of aspiration.











DRINNEN,MICHELLE OT            2023 15:16